# Patient Record
Sex: FEMALE | Race: WHITE
[De-identification: names, ages, dates, MRNs, and addresses within clinical notes are randomized per-mention and may not be internally consistent; named-entity substitution may affect disease eponyms.]

---

## 2017-02-02 ENCOUNTER — APPOINTMENT (OUTPATIENT)
Dept: OTOLARYNGOLOGY | Facility: CLINIC | Age: 77
End: 2017-02-02

## 2017-02-02 VITALS
SYSTOLIC BLOOD PRESSURE: 133 MMHG | HEART RATE: 73 BPM | HEIGHT: 67 IN | BODY MASS INDEX: 20.72 KG/M2 | WEIGHT: 132 LBS | DIASTOLIC BLOOD PRESSURE: 76 MMHG

## 2017-02-02 DIAGNOSIS — H91.90 UNSPECIFIED HEARING LOSS, UNSPECIFIED EAR: ICD-10-CM

## 2017-03-27 ENCOUNTER — APPOINTMENT (OUTPATIENT)
Dept: INTERNAL MEDICINE | Facility: CLINIC | Age: 77
End: 2017-03-27

## 2017-03-27 VITALS — HEART RATE: 72 BPM | SYSTOLIC BLOOD PRESSURE: 128 MMHG | RESPIRATION RATE: 14 BRPM | DIASTOLIC BLOOD PRESSURE: 78 MMHG

## 2017-03-29 LAB
ALBUMIN SERPL ELPH-MCNC: 4.6 G/DL
ALP BLD-CCNC: 76 U/L
ALT SERPL-CCNC: 30 U/L
ANION GAP SERPL CALC-SCNC: 20 MMOL/L
AST SERPL-CCNC: 33 U/L
BILIRUB SERPL-MCNC: 0.4 MG/DL
BUN SERPL-MCNC: 15 MG/DL
CALCIUM SERPL-MCNC: 10.3 MG/DL
CHLORIDE SERPL-SCNC: 100 MMOL/L
CHOLEST SERPL-MCNC: 261 MG/DL
CHOLEST/HDLC SERPL: 2.7 RATIO
CO2 SERPL-SCNC: 23 MMOL/L
CREAT SERPL-MCNC: 0.68 MG/DL
GLUCOSE SERPL-MCNC: 100 MG/DL
HBA1C MFR BLD HPLC: 5.9 %
HDLC SERPL-MCNC: 95 MG/DL
LDLC SERPL CALC-MCNC: 153 MG/DL
POTASSIUM SERPL-SCNC: 4.6 MMOL/L
PROT SERPL-MCNC: 7.3 G/DL
SODIUM SERPL-SCNC: 143 MMOL/L
TRIGL SERPL-MCNC: 66 MG/DL

## 2017-03-30 ENCOUNTER — TRANSCRIPTION ENCOUNTER (OUTPATIENT)
Age: 77
End: 2017-03-30

## 2017-04-21 ENCOUNTER — APPOINTMENT (OUTPATIENT)
Dept: SURGERY | Facility: CLINIC | Age: 77
End: 2017-04-21

## 2017-04-21 VITALS
SYSTOLIC BLOOD PRESSURE: 131 MMHG | BODY MASS INDEX: 21.21 KG/M2 | DIASTOLIC BLOOD PRESSURE: 79 MMHG | OXYGEN SATURATION: 99 % | HEIGHT: 66 IN | TEMPERATURE: 98.1 F | RESPIRATION RATE: 14 BRPM | HEART RATE: 70 BPM | WEIGHT: 132 LBS

## 2017-04-21 DIAGNOSIS — K43.9 VENTRAL HERNIA W/OUT OBSTRUCTION OR GANGRENE: ICD-10-CM

## 2017-04-21 RX ORDER — TRIAMCINOLONE ACETONIDE 1 MG/G
0.1 CREAM TOPICAL
Qty: 45 | Refills: 0 | Status: DISCONTINUED | COMMUNITY
Start: 2016-12-12 | End: 2017-04-21

## 2017-04-24 LAB
MRSA SPEC QL CULT: NOT DETECTED
STAPH AUREUS (SA): DETECTED

## 2017-04-27 ENCOUNTER — FORM ENCOUNTER (OUTPATIENT)
Age: 77
End: 2017-04-27

## 2017-04-28 ENCOUNTER — APPOINTMENT (OUTPATIENT)
Dept: CT IMAGING | Facility: CLINIC | Age: 77
End: 2017-04-28

## 2017-04-28 ENCOUNTER — OUTPATIENT (OUTPATIENT)
Dept: OUTPATIENT SERVICES | Facility: HOSPITAL | Age: 77
LOS: 1 days | End: 2017-04-28
Payer: COMMERCIAL

## 2017-04-28 DIAGNOSIS — Z98.89 OTHER SPECIFIED POSTPROCEDURAL STATES: Chronic | ICD-10-CM

## 2017-04-28 DIAGNOSIS — Z98.41 CATARACT EXTRACTION STATUS, RIGHT EYE: Chronic | ICD-10-CM

## 2017-04-28 DIAGNOSIS — Z41.9 ENCOUNTER FOR PROCEDURE FOR PURPOSES OTHER THAN REMEDYING HEALTH STATE, UNSPECIFIED: Chronic | ICD-10-CM

## 2017-04-28 DIAGNOSIS — Z90.710 ACQUIRED ABSENCE OF BOTH CERVIX AND UTERUS: Chronic | ICD-10-CM

## 2017-04-28 DIAGNOSIS — K66.0 PERITONEAL ADHESIONS (POSTPROCEDURAL) (POSTINFECTION): Chronic | ICD-10-CM

## 2017-04-28 DIAGNOSIS — K43.9 VENTRAL HERNIA WITHOUT OBSTRUCTION OR GANGRENE: ICD-10-CM

## 2017-04-28 PROCEDURE — 82565 ASSAY OF CREATININE: CPT

## 2017-04-28 PROCEDURE — 74177 CT ABD & PELVIS W/CONTRAST: CPT

## 2017-05-09 ENCOUNTER — LABORATORY RESULT (OUTPATIENT)
Age: 77
End: 2017-05-09

## 2017-05-09 ENCOUNTER — APPOINTMENT (OUTPATIENT)
Dept: INTERNAL MEDICINE | Facility: CLINIC | Age: 77
End: 2017-05-09

## 2017-05-09 VITALS — SYSTOLIC BLOOD PRESSURE: 110 MMHG | DIASTOLIC BLOOD PRESSURE: 70 MMHG

## 2017-05-12 ENCOUNTER — RESULT REVIEW (OUTPATIENT)
Age: 77
End: 2017-05-12

## 2017-05-12 LAB
B BURGDOR IGG+IGM SER QL IB: NORMAL
INSECT SPEC: ABNORMAL

## 2017-06-20 ENCOUNTER — LABORATORY RESULT (OUTPATIENT)
Age: 77
End: 2017-06-20

## 2017-06-21 ENCOUNTER — APPOINTMENT (OUTPATIENT)
Dept: INTERNAL MEDICINE | Facility: CLINIC | Age: 77
End: 2017-06-21

## 2017-06-21 VITALS — RESPIRATION RATE: 14 BRPM | DIASTOLIC BLOOD PRESSURE: 70 MMHG | SYSTOLIC BLOOD PRESSURE: 110 MMHG | HEART RATE: 72 BPM

## 2017-06-21 RX ORDER — DOXYCYCLINE HYCLATE 100 MG/1
100 TABLET ORAL ONCE
Qty: 2 | Refills: 0 | Status: DISCONTINUED | COMMUNITY
Start: 2017-05-09 | End: 2017-06-21

## 2017-06-22 LAB
B BURGDOR IGG+IGM SER QL IB: NORMAL
HCV AB SER QL: NONREACTIVE
HCV S/CO RATIO: 0.09 S/CO

## 2017-09-14 ENCOUNTER — APPOINTMENT (OUTPATIENT)
Dept: INTERNAL MEDICINE | Facility: CLINIC | Age: 77
End: 2017-09-14
Payer: COMMERCIAL

## 2017-09-14 PROCEDURE — 90662 IIV NO PRSV INCREASED AG IM: CPT

## 2017-09-14 PROCEDURE — G0008: CPT

## 2017-11-29 ENCOUNTER — APPOINTMENT (OUTPATIENT)
Dept: INTERNAL MEDICINE | Facility: CLINIC | Age: 77
End: 2017-11-29
Payer: COMMERCIAL

## 2017-11-29 PROCEDURE — 36415 COLL VENOUS BLD VENIPUNCTURE: CPT

## 2017-11-30 LAB
25(OH)D3 SERPL-MCNC: 31.8 NG/ML
ALBUMIN SERPL ELPH-MCNC: 4.4 G/DL
ALP BLD-CCNC: 74 U/L
ALT SERPL-CCNC: 19 U/L
ANION GAP SERPL CALC-SCNC: 12 MMOL/L
APPEARANCE: CLEAR
AST SERPL-CCNC: 25 U/L
BASOPHILS # BLD AUTO: 0.02 K/UL
BASOPHILS NFR BLD AUTO: 0.4 %
BILIRUB SERPL-MCNC: 0.5 MG/DL
BILIRUBIN URINE: NEGATIVE
BLOOD URINE: NEGATIVE
BUN SERPL-MCNC: 13 MG/DL
CALCIUM SERPL-MCNC: 9.7 MG/DL
CHLORIDE SERPL-SCNC: 101 MMOL/L
CHOLEST SERPL-MCNC: 243 MG/DL
CHOLEST/HDLC SERPL: 2.6 RATIO
CO2 SERPL-SCNC: 29 MMOL/L
COLOR: YELLOW
CREAT SERPL-MCNC: 0.57 MG/DL
CREAT SPEC-SCNC: 64 MG/DL
EOSINOPHIL # BLD AUTO: 0.2 K/UL
EOSINOPHIL NFR BLD AUTO: 4
GLUCOSE QUALITATIVE U: NEGATIVE MG/DL
GLUCOSE SERPL-MCNC: 93 MG/DL
HBA1C MFR BLD HPLC: 5.6 %
HCT VFR BLD CALC: 41.2 %
HDLC SERPL-MCNC: 93 MG/DL
HGB BLD-MCNC: 13 G/DL
IMM GRANULOCYTES NFR BLD AUTO: 0.2 %
KETONES URINE: NEGATIVE
LDLC SERPL CALC-MCNC: 135 MG/DL
LEUKOCYTE ESTERASE URINE: NEGATIVE
LYMPHOCYTES # BLD AUTO: 1.52 K/UL
LYMPHOCYTES NFR BLD AUTO: 30 %
MAN DIFF?: NORMAL
MCHC RBC-ENTMCNC: 28.1 PG
MCHC RBC-ENTMCNC: 31.6 GM/DL
MCV RBC AUTO: 89 FL
MICROALBUMIN 24H UR DL<=1MG/L-MCNC: 2.1 MG/DL
MICROALBUMIN/CREAT 24H UR-RTO: 33 MG/G
MONOCYTES # BLD AUTO: 0.35 K/UL
MONOCYTES NFR BLD AUTO: 6.9 %
NEUTROPHILS # BLD AUTO: 2.96 K/UL
NEUTROPHILS NFR BLD AUTO: 58.5 %
NITRITE URINE: NEGATIVE
PH URINE: 8
PLATELET # BLD AUTO: 184 K/UL
POTASSIUM SERPL-SCNC: 4.7 MMOL/L
PROT SERPL-MCNC: 6.5 G/DL
PROTEIN URINE: NEGATIVE MG/DL
RBC # BLD: 4.63 M/UL
RBC # FLD: 14.7 %
SODIUM SERPL-SCNC: 142 MMOL/L
SPECIFIC GRAVITY URINE: 1.01
T4 SERPL-MCNC: 6.6 UG/DL
TRIGL SERPL-MCNC: 75 MG/DL
TSH SERPL-ACNC: 1.21 UIU/ML
UROBILINOGEN URINE: NEGATIVE MG/DL
WBC # FLD AUTO: 5.06 K/UL

## 2017-12-01 LAB — MAGNESIUM SERPL-MCNC: 2 MG/DL

## 2017-12-08 ENCOUNTER — NON-APPOINTMENT (OUTPATIENT)
Age: 77
End: 2017-12-08

## 2017-12-08 ENCOUNTER — APPOINTMENT (OUTPATIENT)
Dept: INTERNAL MEDICINE | Facility: CLINIC | Age: 77
End: 2017-12-08
Payer: COMMERCIAL

## 2017-12-08 VITALS — HEART RATE: 72 BPM | DIASTOLIC BLOOD PRESSURE: 70 MMHG | RESPIRATION RATE: 14 BRPM | SYSTOLIC BLOOD PRESSURE: 126 MMHG

## 2017-12-08 VITALS — BODY MASS INDEX: 21.56 KG/M2 | HEIGHT: 65.5 IN | WEIGHT: 131 LBS

## 2017-12-08 PROCEDURE — 93000 ELECTROCARDIOGRAM COMPLETE: CPT

## 2017-12-08 PROCEDURE — 99397 PER PM REEVAL EST PAT 65+ YR: CPT | Mod: 25

## 2017-12-08 PROCEDURE — 94010 BREATHING CAPACITY TEST: CPT

## 2017-12-08 PROCEDURE — 99213 OFFICE O/P EST LOW 20 MIN: CPT | Mod: 25

## 2017-12-08 RX ORDER — ERYTHROMYCIN 5 MG/G
5 OINTMENT OPHTHALMIC
Qty: 4 | Refills: 0 | Status: COMPLETED | COMMUNITY
Start: 2017-10-13

## 2017-12-08 RX ORDER — NEOMYCIN SULFATE, POLYMYXIN B SULFATE AND DEXAMETHASONE 3.5; 10000; 1 MG/ML; [USP'U]/ML; MG/ML
3.5-10000-0.1 SUSPENSION OPHTHALMIC
Qty: 5 | Refills: 0 | Status: COMPLETED | COMMUNITY
Start: 2017-10-19

## 2017-12-08 RX ORDER — LOTEPREDNOL ETABONATE 5 MG/ML
0.5 SUSPENSION/ DROPS OPHTHALMIC
Qty: 5 | Refills: 0 | Status: COMPLETED | COMMUNITY
Start: 2017-10-25

## 2018-01-10 ENCOUNTER — APPOINTMENT (OUTPATIENT)
Dept: INTERNAL MEDICINE | Facility: CLINIC | Age: 78
End: 2018-01-10
Payer: COMMERCIAL

## 2018-01-10 VITALS — DIASTOLIC BLOOD PRESSURE: 70 MMHG | RESPIRATION RATE: 14 BRPM | SYSTOLIC BLOOD PRESSURE: 110 MMHG | HEART RATE: 72 BPM

## 2018-01-10 PROCEDURE — 99213 OFFICE O/P EST LOW 20 MIN: CPT

## 2018-01-16 ENCOUNTER — TRANSCRIPTION ENCOUNTER (OUTPATIENT)
Age: 78
End: 2018-01-16

## 2018-03-09 ENCOUNTER — APPOINTMENT (OUTPATIENT)
Dept: INTERNAL MEDICINE | Facility: CLINIC | Age: 78
End: 2018-03-09
Payer: COMMERCIAL

## 2018-03-09 VITALS — SYSTOLIC BLOOD PRESSURE: 110 MMHG | HEART RATE: 72 BPM | RESPIRATION RATE: 14 BRPM | DIASTOLIC BLOOD PRESSURE: 70 MMHG

## 2018-03-09 PROCEDURE — 99213 OFFICE O/P EST LOW 20 MIN: CPT

## 2018-05-08 ENCOUNTER — APPOINTMENT (OUTPATIENT)
Dept: INTERNAL MEDICINE | Facility: CLINIC | Age: 78
End: 2018-05-08
Payer: COMMERCIAL

## 2018-05-08 VITALS — DIASTOLIC BLOOD PRESSURE: 80 MMHG | TEMPERATURE: 98.4 F | SYSTOLIC BLOOD PRESSURE: 120 MMHG
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
## 2018-05-08 PROCEDURE — 99214 OFFICE O/P EST MOD 30 MIN: CPT

## 2018-05-08 RX ORDER — AZITHROMYCIN 250 MG/1
250 TABLET, FILM COATED ORAL
Qty: 1 | Refills: 0 | Status: DISCONTINUED | COMMUNITY
Start: 2018-03-09 | End: 2018-05-08

## 2018-05-08 RX ORDER — MUPIROCIN 20 MG/G
2 OINTMENT TOPICAL
Qty: 15 | Refills: 0 | Status: DISCONTINUED | COMMUNITY
Start: 2017-04-24 | End: 2018-05-08

## 2018-05-09 ENCOUNTER — TRANSCRIPTION ENCOUNTER (OUTPATIENT)
Age: 78
End: 2018-05-09

## 2018-05-09 ENCOUNTER — APPOINTMENT (OUTPATIENT)
Dept: INTERNAL MEDICINE | Facility: CLINIC | Age: 78
End: 2018-05-09
Payer: COMMERCIAL

## 2018-05-09 VITALS — HEART RATE: 72 BPM | RESPIRATION RATE: 14 BRPM | DIASTOLIC BLOOD PRESSURE: 80 MMHG | SYSTOLIC BLOOD PRESSURE: 120 MMHG

## 2018-05-09 DIAGNOSIS — J30.2 OTHER SEASONAL ALLERGIC RHINITIS: ICD-10-CM

## 2018-05-09 LAB
BASOPHILS # BLD AUTO: 0.02 K/UL
BASOPHILS NFR BLD AUTO: 0.2 %
EOSINOPHIL # BLD AUTO: 0.3 K/UL
EOSINOPHIL NFR BLD AUTO: 3.6 %
HCT VFR BLD CALC: 40.2 %
HGB BLD-MCNC: 12.9 G/DL
IMM GRANULOCYTES NFR BLD AUTO: 0.2 %
LYMPHOCYTES # BLD AUTO: 1.72 K/UL
LYMPHOCYTES NFR BLD AUTO: 20.6 %
MAN DIFF?: NORMAL
MCHC RBC-ENTMCNC: 27.5 PG
MCHC RBC-ENTMCNC: 32.1 GM/DL
MCV RBC AUTO: 85.7 FL
MONOCYTES # BLD AUTO: 0.61 K/UL
MONOCYTES NFR BLD AUTO: 7.3 %
NEUTROPHILS # BLD AUTO: 5.67 K/UL
NEUTROPHILS NFR BLD AUTO: 68.1 %
PLATELET # BLD AUTO: 216 K/UL
RBC # BLD: 4.69 M/UL
RBC # FLD: 14.7 %
WBC # FLD AUTO: 8.34 K/UL

## 2018-05-09 PROCEDURE — 36415 COLL VENOUS BLD VENIPUNCTURE: CPT

## 2018-05-09 PROCEDURE — 99000 SPECIMEN HANDLING OFFICE-LAB: CPT

## 2018-05-09 PROCEDURE — 99213 OFFICE O/P EST LOW 20 MIN: CPT | Mod: 25

## 2018-05-25 ENCOUNTER — APPOINTMENT (OUTPATIENT)
Dept: CARDIOLOGY | Facility: CLINIC | Age: 78
End: 2018-05-25

## 2018-06-14 ENCOUNTER — LABORATORY RESULT (OUTPATIENT)
Age: 78
End: 2018-06-14

## 2018-06-15 ENCOUNTER — APPOINTMENT (OUTPATIENT)
Dept: CARDIOLOGY | Facility: CLINIC | Age: 78
End: 2018-06-15
Payer: COMMERCIAL

## 2018-06-15 ENCOUNTER — NON-APPOINTMENT (OUTPATIENT)
Age: 78
End: 2018-06-15

## 2018-06-15 VITALS
HEIGHT: 66 IN | RESPIRATION RATE: 16 BRPM | HEART RATE: 76 BPM | BODY MASS INDEX: 21.69 KG/M2 | DIASTOLIC BLOOD PRESSURE: 70 MMHG | WEIGHT: 135 LBS | SYSTOLIC BLOOD PRESSURE: 118 MMHG

## 2018-06-15 PROCEDURE — 93000 ELECTROCARDIOGRAM COMPLETE: CPT

## 2018-06-15 PROCEDURE — 99213 OFFICE O/P EST LOW 20 MIN: CPT

## 2018-06-15 RX ORDER — IPRATROPIUM BROMIDE 21 UG/1
0.03 SPRAY NASAL
Qty: 1 | Refills: 1 | Status: DISCONTINUED | COMMUNITY
Start: 2018-05-08 | End: 2018-06-15

## 2018-06-15 RX ORDER — BENZONATATE 100 MG/1
100 CAPSULE ORAL 3 TIMES DAILY
Qty: 30 | Refills: 0 | Status: DISCONTINUED | COMMUNITY
Start: 2018-05-10 | End: 2018-06-15

## 2018-06-15 RX ORDER — CYCLOSPORINE 0.5 MG/ML
0.05 EMULSION OPHTHALMIC
Qty: 60 | Refills: 0 | Status: DISCONTINUED | COMMUNITY
Start: 2017-11-08 | End: 2018-06-15

## 2018-06-15 RX ORDER — FLUTICASONE PROPIONATE 50 UG/1
50 SPRAY, METERED NASAL TWICE DAILY
Qty: 1 | Refills: 1 | Status: DISCONTINUED | COMMUNITY
Start: 2018-05-08 | End: 2018-06-15

## 2018-06-15 RX ORDER — AZITHROMYCIN 250 MG/1
250 TABLET, FILM COATED ORAL
Qty: 6 | Refills: 0 | Status: DISCONTINUED | COMMUNITY
Start: 2018-05-10 | End: 2018-06-15

## 2018-06-15 RX ORDER — MONTELUKAST 10 MG/1
10 TABLET, FILM COATED ORAL
Qty: 30 | Refills: 2 | Status: DISCONTINUED | COMMUNITY
Start: 2018-05-08 | End: 2018-06-15

## 2018-07-09 ENCOUNTER — APPOINTMENT (OUTPATIENT)
Dept: INTERNAL MEDICINE | Facility: CLINIC | Age: 78
End: 2018-07-09
Payer: COMMERCIAL

## 2018-07-09 PROCEDURE — 90750 HZV VACC RECOMBINANT IM: CPT

## 2018-07-09 PROCEDURE — 90471 IMMUNIZATION ADMIN: CPT

## 2018-10-08 ENCOUNTER — APPOINTMENT (OUTPATIENT)
Dept: INTERNAL MEDICINE | Facility: CLINIC | Age: 78
End: 2018-10-08
Payer: COMMERCIAL

## 2018-10-08 PROCEDURE — G0008: CPT

## 2018-10-08 PROCEDURE — 90662 IIV NO PRSV INCREASED AG IM: CPT

## 2018-11-01 ENCOUNTER — TRANSCRIPTION ENCOUNTER (OUTPATIENT)
Age: 78
End: 2018-11-01

## 2018-11-05 ENCOUNTER — APPOINTMENT (OUTPATIENT)
Dept: INTERNAL MEDICINE | Facility: CLINIC | Age: 78
End: 2018-11-05
Payer: COMMERCIAL

## 2018-11-05 PROCEDURE — 90750 HZV VACC RECOMBINANT IM: CPT

## 2018-11-05 PROCEDURE — 90471 IMMUNIZATION ADMIN: CPT

## 2018-11-07 ENCOUNTER — TRANSCRIPTION ENCOUNTER (OUTPATIENT)
Age: 78
End: 2018-11-07

## 2018-11-15 ENCOUNTER — LABORATORY RESULT (OUTPATIENT)
Age: 78
End: 2018-11-15

## 2018-11-16 ENCOUNTER — APPOINTMENT (OUTPATIENT)
Dept: CARDIOLOGY | Facility: CLINIC | Age: 78
End: 2018-11-16
Payer: COMMERCIAL

## 2018-11-16 VITALS
WEIGHT: 130 LBS | RESPIRATION RATE: 15 BRPM | DIASTOLIC BLOOD PRESSURE: 74 MMHG | HEIGHT: 66 IN | SYSTOLIC BLOOD PRESSURE: 122 MMHG | BODY MASS INDEX: 20.89 KG/M2 | HEART RATE: 66 BPM

## 2018-11-16 PROCEDURE — 93015 CV STRESS TEST SUPVJ I&R: CPT

## 2018-11-16 PROCEDURE — 99213 OFFICE O/P EST LOW 20 MIN: CPT | Mod: 25

## 2018-11-16 PROCEDURE — 93306 TTE W/DOPPLER COMPLETE: CPT

## 2018-11-16 PROCEDURE — ZZZZZ: CPT

## 2018-11-16 NOTE — PHYSICAL EXAM
[General Appearance - Well Developed] : well developed [Normal Appearance] : normal appearance [Well Groomed] : well groomed [General Appearance - Well Nourished] : well nourished [No Deformities] : no deformities [General Appearance - In No Acute Distress] : no acute distress [Normal Conjunctiva] : the conjunctiva exhibited no abnormalities [Normal Oral Mucosa] : normal oral mucosa [Normal Oropharynx] : normal oropharynx [Normal Jugular Venous A Waves Present] : normal jugular venous A waves present [Normal Jugular Venous V Waves Present] : normal jugular venous V waves present [No Jugular Venous Feldman A Waves] : no jugular venous feldman A waves [Respiration, Rhythm And Depth] : normal respiratory rhythm and effort [Exaggerated Use Of Accessory Muscles For Inspiration] : no accessory muscle use [Auscultation Breath Sounds / Voice Sounds] : lungs were clear to auscultation bilaterally [Bowel Sounds] : normal bowel sounds [Abdomen Soft] : soft [Abnormal Walk] : normal gait [Nail Clubbing] : no clubbing of the fingernails [Cyanosis, Localized] : no localized cyanosis [Skin Color & Pigmentation] : normal skin color and pigmentation [Skin Turgor] : normal skin turgor [] : no rash [Oriented To Time, Place, And Person] : oriented to person, place, and time [Impaired Insight] : insight and judgment were intact [No Anxiety] : not feeling anxious [5th Left ICS - MCL] : palpated at the 5th LICS in the midclavicular line [Normal] : normal [No Precordial Heave] : no precordial heave was noted [Normal Rate] : normal [Rhythm Regular] : regular [Normal S1] : normal S1 [Normal S2] : normal S2 [No Gallop] : no gallop heard [II] : a grade 2 [I] : a grade 1 [2+] : left 2+ [No Abnormalities] : the abdominal aorta was not enlarged and no bruit was heard [No Pitting Edema] : no pitting edema present [Apical Thrill] : no thrill palpable at the apex [S3] : no S3 [S4] : no S4 [Click] : no click [Pericardial Rub] : no pericardial rub [Right Carotid Bruit] : no bruit heard over the right carotid [Left Carotid Bruit] : no bruit heard over the left carotid [Right Femoral Bruit] : no bruit heard over the right femoral artery [Left Femoral Bruit] : no bruit heard over the left femoral artery

## 2018-11-16 NOTE — REASON FOR VISIT
[Follow-Up - Clinic] : a clinic follow-up of [Dyspnea] : dyspnea [Hyperlipidemia] : hyperlipidemia [Mitral Regurgitation] : mitral regurgitation [FreeTextEntry1] : 77-year-old female past medical history is that of a murmur, hyperlipidemia, minimal coronary disease, who comes in for followup cardiac evaluation.

## 2018-11-16 NOTE — DISCUSSION/SUMMARY
[FreeTextEntry1] : This is a 77-year-old female past medical history is that of a murmur, hyperlipidemia, minimal coronary disease, who comes in for followup cardiac evaluation.The patient is doing well from a cardiac standpoint. She denies chest pain, shortness of breath, dizziness or syncope. \par The patient has occasional dyspnea on exertion.\par The patient had a normal  stress test November 16 2018. She also had an echo Doppler examination done today.\par She will continue on her current medical therapy.\par  Her blood pressure is under good control. I counseled with regard to diet, exercise, and salt intake. She is instructed to follow up with you regarding her medical care. She will undergo blood work later today.\par The patient understands that aerobic exercises must be increased to 40 minutes 4 times per week. A detailed discussion of lifestyle modification was done today. The patient has a good understanding of the diagnosis, and treatment plan. Lifestyle modification was also outlined.

## 2018-11-16 NOTE — REVIEW OF SYSTEMS
[Dyspnea on exertion] : dyspnea during exertion [Negative] : Heme/Lymph [Chest  Pressure] : no chest pressure [Chest Pain] : no chest pain [Lower Ext Edema] : no extremity edema [Leg Claudication] : no intermittent leg claudication [Palpitations] : no palpitations

## 2018-11-23 ENCOUNTER — APPOINTMENT (OUTPATIENT)
Dept: INTERNAL MEDICINE | Facility: CLINIC | Age: 78
End: 2018-11-23
Payer: COMMERCIAL

## 2018-11-23 VITALS — TEMPERATURE: 98.2 F | SYSTOLIC BLOOD PRESSURE: 120 MMHG | DIASTOLIC BLOOD PRESSURE: 70 MMHG

## 2018-11-23 PROCEDURE — 99214 OFFICE O/P EST MOD 30 MIN: CPT

## 2018-12-14 ENCOUNTER — APPOINTMENT (OUTPATIENT)
Dept: INTERNAL MEDICINE | Facility: CLINIC | Age: 78
End: 2018-12-14
Payer: COMMERCIAL

## 2018-12-14 ENCOUNTER — LABORATORY RESULT (OUTPATIENT)
Age: 78
End: 2018-12-14

## 2018-12-14 VITALS — DIASTOLIC BLOOD PRESSURE: 70 MMHG | HEART RATE: 72 BPM | RESPIRATION RATE: 14 BRPM | SYSTOLIC BLOOD PRESSURE: 130 MMHG

## 2018-12-14 VITALS — HEIGHT: 66 IN | BODY MASS INDEX: 21.05 KG/M2 | WEIGHT: 131 LBS

## 2018-12-14 PROCEDURE — 99397 PER PM REEVAL EST PAT 65+ YR: CPT | Mod: 25

## 2018-12-14 PROCEDURE — 94010 BREATHING CAPACITY TEST: CPT

## 2018-12-14 PROCEDURE — 99213 OFFICE O/P EST LOW 20 MIN: CPT | Mod: 25

## 2018-12-14 PROCEDURE — 99000 SPECIMEN HANDLING OFFICE-LAB: CPT

## 2018-12-14 PROCEDURE — 36415 COLL VENOUS BLD VENIPUNCTURE: CPT

## 2018-12-14 RX ORDER — AMOXICILLIN AND CLAVULANATE POTASSIUM 875; 125 MG/1; MG/1
875-125 TABLET, COATED ORAL
Qty: 14 | Refills: 0 | Status: DISCONTINUED | COMMUNITY
Start: 2018-11-23 | End: 2018-12-14

## 2018-12-14 NOTE — HEALTH RISK ASSESSMENT
[Excellent] : ~his/her~  mood as  excellent [] : No [No falls in past year] : Patient reported no falls in the past year [0] : 2) Feeling down, depressed, or hopeless: Not at all (0) [[id number]] : 0 [Change in mental status noted] : No change in mental status noted [With Significant Other] : lives with significant other [] :  [Fully functional (bathing, dressing, toileting, transferring, walking, feeding)] : Fully functional (bathing, dressing, toileting, transferring, walking, feeding) [Fully functional (using the telephone, shopping, preparing meals, housekeeping, doing laundry, using] : Fully functional and needs no help or supervision to perform IADLs (using the telephone, shopping, preparing meals, housekeeping, doing laundry, using transportation, managing medications and managing finances) [Reports changes in hearing] : Reports no changes in hearing [Reports changes in vision] : Reports no changes in vision [Reports changes in dental health] : Reports no changes in dental health

## 2018-12-14 NOTE — PHYSICAL EXAM
[General Appearance - Alert] : alert [General Appearance - In No Acute Distress] : in no acute distress [Sclera] : the sclera and conjunctiva were normal [PERRL With Normal Accommodation] : pupils were equal in size, round, and reactive to light [Extraocular Movements] : extraocular movements were intact [Outer Ear] : the ears and nose were normal in appearance [Oropharynx] : the oropharynx was normal [Neck Appearance] : the appearance of the neck was normal [Neck Cervical Mass (___cm)] : no neck mass was observed [Jugular Venous Distention Increased] : there was no jugular-venous distention [Thyroid Diffuse Enlargement] : the thyroid was not enlarged [Thyroid Nodule] : there were no palpable thyroid nodules [Auscultation Breath Sounds / Voice Sounds] : lungs were clear to auscultation bilaterally [Heart Rate And Rhythm] : heart rate was normal and rhythm regular [Heart Sounds] : normal S1 and S2 [Heart Sounds Gallop] : no gallops [Murmurs] : no murmurs [Heart Sounds Pericardial Friction Rub] : no pericardial rub [Full Pulse] : the pedal pulses are present [Edema] : there was no peripheral edema [Bowel Sounds] : normal bowel sounds [Abdomen Soft] : soft [Abdomen Tenderness] : non-tender [] : no hepato-splenomegaly [Abdomen Mass (___ Cm)] : no abdominal mass palpated [Cervical Lymph Nodes Enlarged Posterior Bilaterally] : posterior cervical [Cervical Lymph Nodes Enlarged Anterior Bilaterally] : anterior cervical [Supraclavicular Lymph Nodes Enlarged Bilaterally] : supraclavicular [No CVA Tenderness] : no ~M costovertebral angle tenderness [No Spinal Tenderness] : no spinal tenderness [Abnormal Walk] : normal gait [Nail Clubbing] : no clubbing  or cyanosis of the fingernails [Musculoskeletal - Swelling] : no joint swelling seen [Motor Tone] : muscle strength and tone were normal [FreeTextEntry1] : Derm Dr Montejo q3-6months [Deep Tendon Reflexes (DTR)] : deep tendon reflexes were 2+ and symmetric [Sensation] : the sensory exam was normal to light touch and pinprick [No Focal Deficits] : no focal deficits [Oriented To Time, Place, And Person] : oriented to person, place, and time [Impaired Insight] : insight and judgment were intact [Affect] : the affect was normal

## 2018-12-14 NOTE — HISTORY OF PRESENT ILLNESS
[Health Maintenance] : health maintenance [___ Year(s) Ago] : [unfilled] year(s) ago [Good] : good [Reg. Dental Visits] : She has regular dental visits [Vision Problems] : She complains of vision problems [Eye Exam < 1 Year] : an eye examination within the last year [Hearing Loss] : She denies hearing loss [Healthy Diet] : She consumes a diverse and healthy diet [Weight Concerns] : She does not have any weight concerns [Regular Exercise] : She exercises regularly [Less than 3 times/wk] : less than three times a week [Walking] : walking [Tobacco Use] : She does not use tobacco [Alcohol Use] : She consumes alcohol [Drug Abuse] : She denies drug use [Postmenopausal] : the patient is postmenopausal [Reviewed and Updated] : risk screening reviewed and updated [Up to Date] : up to date [FreeTextEntry1] : Pt presents for f/u evaluation of borderline lipids/ borderline HgA1c., low vitamin D. Also here for her annual physical.\par She recently saw cardiology.\par \par Without acute complaints today.\par Admits to occasional difficulty with word finding.\par Not sleeping well.\par Less than 4 hours nightly.

## 2018-12-14 NOTE — ASSESSMENT
[FreeTextEntry1] : Labs reviewed\par Blood work was drawn and sent to the lab today. The patient has been instructed to call the office next week to discuss today's lab work.\par \par Continue Vitamin D3\par \par For OBGYN evaluation \par \par Recheck Lipids in 6 months\par Colonoscopy due - pt advised.\par \par Routine health counselling provided\par Dermatology evaluation\par Sleep hygiene discussed in detail with patient.\par \par Annual CC

## 2018-12-21 LAB
25(OH)D3 SERPL-MCNC: 22.5 NG/ML
APPEARANCE: CLEAR
B BURGDOR IGG+IGM SER QL IB: NORMAL
BILIRUBIN URINE: NEGATIVE
BLOOD URINE: NEGATIVE
COLOR: YELLOW
FOLATE SERPL-MCNC: 15.2 NG/ML
GLUCOSE QUALITATIVE U: NEGATIVE MG/DL
KETONES URINE: NEGATIVE
LEUKOCYTE ESTERASE URINE: NEGATIVE
MAGNESIUM SERPL-MCNC: 2.2 MG/DL
NITRITE URINE: NEGATIVE
PH URINE: 6
PROTEIN URINE: NEGATIVE MG/DL
SPECIFIC GRAVITY URINE: 1.03
T PALLIDUM AB SER QL IA: NEGATIVE
UROBILINOGEN URINE: 1 MG/DL
VIT B12 SERPL-MCNC: 540 PG/ML

## 2019-03-02 ENCOUNTER — TRANSCRIPTION ENCOUNTER (OUTPATIENT)
Age: 79
End: 2019-03-02

## 2019-03-08 ENCOUNTER — APPOINTMENT (OUTPATIENT)
Dept: COLORECTAL SURGERY | Facility: CLINIC | Age: 79
End: 2019-03-08
Payer: COMMERCIAL

## 2019-03-08 VITALS
RESPIRATION RATE: 14 BRPM | OXYGEN SATURATION: 100 % | WEIGHT: 130 LBS | BODY MASS INDEX: 20.89 KG/M2 | DIASTOLIC BLOOD PRESSURE: 83 MMHG | HEIGHT: 66 IN | SYSTOLIC BLOOD PRESSURE: 136 MMHG | HEART RATE: 62 BPM

## 2019-03-08 PROCEDURE — 99203 OFFICE O/P NEW LOW 30 MIN: CPT

## 2019-03-08 NOTE — HISTORY OF PRESENT ILLNESS
[FreeTextEntry1] : 77yo female presents for office visit prior to colonoscopy.  Pt underwent colonoscopy Nov.2013 (nl study).  Pt has a mostly daily BM, denies constipation/straining, pain or BRBPR.

## 2019-03-08 NOTE — PHYSICAL EXAM
[Normal Breath Sounds] : Normal breath sounds [Normal Heart Sounds] : normal heart sounds [Normal Rate and Rhythm] : normal rate and rhythm [Alert] : alert [Oriented to Person] : oriented to person [Oriented to Place] : oriented to place [Oriented to Time] : oriented to time [Calm] : calm [de-identified] : soft, NT/ND +BS [de-identified] : well nourished female [de-identified] : NC/AT [de-identified] : +ROM/DEEP

## 2019-03-08 NOTE — REVIEW OF SYSTEMS
[Chest Pain] : no chest pain [Palpitations] : no palpitations [Shortness Of Breath] : no shortness of breath [As Noted in HPI] : as noted in HPI [Negative] : Heme/Lymph [FreeTextEntry3] : wears glasses

## 2019-03-08 NOTE — HISTORY OF PRESENT ILLNESS
[FreeTextEntry1] : 79yo female presents for office visit prior to colonoscopy.  Pt underwent colonoscopy Nov.2013 (nl study).  Pt has a mostly daily BM, denies constipation/straining, pain or BRBPR.

## 2019-03-18 ENCOUNTER — APPOINTMENT (OUTPATIENT)
Dept: COLORECTAL SURGERY | Facility: CLINIC | Age: 79
End: 2019-03-18
Payer: COMMERCIAL

## 2019-03-18 PROCEDURE — 45330 DIAGNOSTIC SIGMOIDOSCOPY: CPT

## 2019-03-28 ENCOUNTER — APPOINTMENT (OUTPATIENT)
Dept: CT IMAGING | Facility: CLINIC | Age: 79
End: 2019-03-28

## 2019-03-28 ENCOUNTER — OUTPATIENT (OUTPATIENT)
Dept: OUTPATIENT SERVICES | Facility: HOSPITAL | Age: 79
LOS: 1 days | End: 2019-03-28
Payer: COMMERCIAL

## 2019-03-28 DIAGNOSIS — Z41.9 ENCOUNTER FOR PROCEDURE FOR PURPOSES OTHER THAN REMEDYING HEALTH STATE, UNSPECIFIED: Chronic | ICD-10-CM

## 2019-03-28 DIAGNOSIS — Z90.710 ACQUIRED ABSENCE OF BOTH CERVIX AND UTERUS: Chronic | ICD-10-CM

## 2019-03-28 DIAGNOSIS — K66.0 PERITONEAL ADHESIONS (POSTPROCEDURAL) (POSTINFECTION): Chronic | ICD-10-CM

## 2019-03-28 DIAGNOSIS — Z00.8 ENCOUNTER FOR OTHER GENERAL EXAMINATION: ICD-10-CM

## 2019-03-28 DIAGNOSIS — Z98.89 OTHER SPECIFIED POSTPROCEDURAL STATES: Chronic | ICD-10-CM

## 2019-03-28 DIAGNOSIS — Z98.41 CATARACT EXTRACTION STATUS, RIGHT EYE: Chronic | ICD-10-CM

## 2019-03-28 PROCEDURE — 74261 CT COLONOGRAPHY DX: CPT

## 2019-03-28 PROCEDURE — 74261 CT COLONOGRAPHY DX: CPT | Mod: 26

## 2019-05-31 ENCOUNTER — APPOINTMENT (OUTPATIENT)
Dept: CARDIOLOGY | Facility: CLINIC | Age: 79
End: 2019-05-31
Payer: COMMERCIAL

## 2019-05-31 ENCOUNTER — NON-APPOINTMENT (OUTPATIENT)
Age: 79
End: 2019-05-31

## 2019-05-31 VITALS
SYSTOLIC BLOOD PRESSURE: 124 MMHG | RESPIRATION RATE: 16 BRPM | HEIGHT: 66 IN | HEART RATE: 84 BPM | DIASTOLIC BLOOD PRESSURE: 80 MMHG | BODY MASS INDEX: 20.41 KG/M2 | WEIGHT: 127 LBS

## 2019-05-31 PROCEDURE — 99213 OFFICE O/P EST LOW 20 MIN: CPT

## 2019-05-31 PROCEDURE — 93000 ELECTROCARDIOGRAM COMPLETE: CPT

## 2019-06-06 RX ORDER — LACTOBACILLUS ACIDOPHILUS/PECT 30 MG-20MG
TABLET ORAL
Refills: 0 | Status: DISCONTINUED | COMMUNITY
End: 2019-06-06

## 2019-07-03 ENCOUNTER — APPOINTMENT (OUTPATIENT)
Dept: INTERNAL MEDICINE | Facility: CLINIC | Age: 79
End: 2019-07-03
Payer: COMMERCIAL

## 2019-07-03 ENCOUNTER — LABORATORY RESULT (OUTPATIENT)
Age: 79
End: 2019-07-03

## 2019-07-03 VITALS — DIASTOLIC BLOOD PRESSURE: 80 MMHG | SYSTOLIC BLOOD PRESSURE: 132 MMHG

## 2019-07-03 VITALS
HEART RATE: 79 BPM | DIASTOLIC BLOOD PRESSURE: 78 MMHG | WEIGHT: 130 LBS | SYSTOLIC BLOOD PRESSURE: 120 MMHG | OXYGEN SATURATION: 99 % | BODY MASS INDEX: 20.98 KG/M2

## 2019-07-03 PROCEDURE — 99204 OFFICE O/P NEW MOD 45 MIN: CPT

## 2019-07-04 LAB
ALBUMIN SERPL ELPH-MCNC: 4.8 G/DL
ALP BLD-CCNC: 84 U/L
ALT SERPL-CCNC: 22 U/L
ANION GAP SERPL CALC-SCNC: 12 MMOL/L
APPEARANCE: CLEAR
AST SERPL-CCNC: 28 U/L
B BURGDOR IGG+IGM SER QL IB: NORMAL
BACTERIA: NEGATIVE
BASOPHILS # BLD AUTO: 0.04 K/UL
BASOPHILS NFR BLD AUTO: 0.7 %
BILIRUB SERPL-MCNC: 0.5 MG/DL
BILIRUBIN URINE: NEGATIVE
BLOOD URINE: NEGATIVE
BUN SERPL-MCNC: 17 MG/DL
CALCIUM SERPL-MCNC: 10 MG/DL
CHLORIDE SERPL-SCNC: 104 MMOL/L
CO2 SERPL-SCNC: 28 MMOL/L
COLOR: NORMAL
CREAT SERPL-MCNC: 0.59 MG/DL
EOSINOPHIL # BLD AUTO: 0.14 K/UL
EOSINOPHIL NFR BLD AUTO: 2.4 %
ERYTHROCYTE [SEDIMENTATION RATE] IN BLOOD BY WESTERGREN METHOD: 10 MM/HR
GLUCOSE QUALITATIVE U: NEGATIVE
GLUCOSE SERPL-MCNC: 97 MG/DL
HCT VFR BLD CALC: 41.8 %
HGB BLD-MCNC: 13.1 G/DL
HYALINE CASTS: 0 /LPF
IMM GRANULOCYTES NFR BLD AUTO: 0.3 %
KETONES URINE: NEGATIVE
LEUKOCYTE ESTERASE URINE: NEGATIVE
LYMPHOCYTES # BLD AUTO: 1.41 K/UL
LYMPHOCYTES NFR BLD AUTO: 23.8 %
MAN DIFF?: NORMAL
MCHC RBC-ENTMCNC: 27.8 PG
MCHC RBC-ENTMCNC: 31.3 GM/DL
MCV RBC AUTO: 88.6 FL
MICROSCOPIC-UA: NORMAL
MONOCYTES # BLD AUTO: 0.48 K/UL
MONOCYTES NFR BLD AUTO: 8.1 %
NEUTROPHILS # BLD AUTO: 3.83 K/UL
NEUTROPHILS NFR BLD AUTO: 64.7 %
NITRITE URINE: NEGATIVE
PH URINE: 7.5
PLATELET # BLD AUTO: 200 K/UL
POTASSIUM SERPL-SCNC: 4.6 MMOL/L
PROT SERPL-MCNC: 6.8 G/DL
PROTEIN URINE: NEGATIVE
RBC # BLD: 4.72 M/UL
RBC # FLD: 14.1 %
RED BLOOD CELLS URINE: 2 /HPF
SODIUM SERPL-SCNC: 144 MMOL/L
SPECIFIC GRAVITY URINE: 1.01
SQUAMOUS EPITHELIAL CELLS: 1 /HPF
TSH SERPL-ACNC: 0.93 UIU/ML
UROBILINOGEN URINE: NORMAL
WBC # FLD AUTO: 5.92 K/UL
WHITE BLOOD CELLS URINE: 0 /HPF

## 2019-07-08 NOTE — PHYSICAL EXAM
[No Acute Distress] : no acute distress [Well Developed] : well developed [Well Nourished] : well nourished [Well-Appearing] : well-appearing [Normal Voice/Communication] : normal voice/communication [PERRL] : pupils equal round and reactive to light [Normal Sclera/Conjunctiva] : normal sclera/conjunctiva [Normal Outer Ear/Nose] : the outer ears and nose were normal in appearance [Normal Oropharynx] : the oropharynx was normal [Normal TMs] : both tympanic membranes were normal [Normal Nasal Mucosa] : the nasal mucosa was normal [No JVD] : no jugular venous distention [No Lymphadenopathy] : no lymphadenopathy [Supple] : supple [Thyroid Normal, No Nodules] : the thyroid was normal and there were no nodules present [No Accessory Muscle Use] : no accessory muscle use [Clear to Auscultation] : lungs were clear to auscultation bilaterally [Regular Rhythm] : with a regular rhythm [Normal Percussion] : the chest was normal to percussion [Normal Rate] : normal rate  [Normal S1, S2] : normal S1 and S2 [No Carotid Bruits] : no carotid bruits [No Varicosities] : no varicosities [No Edema] : there was no peripheral edema [No Palpable Aorta] : no palpable aorta [Normal Appearance] : normal in appearance [No Masses] : no palpable masses [No Nipple Discharge] : no nipple discharge [No Axillary Lymphadenopathy] : no axillary lymphadenopathy [Soft] : abdomen soft [Non-distended] : non-distended [Non Tender] : non-tender [No HSM] : no HSM [No Hernias] : no hernias [Normal Bowel Sounds] : normal bowel sounds [Normal Axillary Nodes] : no axillary lymphadenopathy [Normal Supraclavicular Nodes] : no supraclavicular lymphadenopathy [Normal Anterior Cervical Nodes] : no anterior cervical lymphadenopathy [Normal Inguinal Nodes] : no inguinal lymphadenopathy [No Spinal Tenderness] : no spinal tenderness [No CVA Tenderness] : no CVA  tenderness [Grossly Normal Strength/Tone] : grossly normal strength/tone [No Rash] : no rash [Coordination Grossly Intact] : coordination grossly intact [Speech Grossly Normal] : speech grossly normal [Alert and Oriented x3] : oriented to person, place, and time [Normal Affect] : the affect was normal [Memory Grossly Normal] : memory grossly normal [Normal Insight/Judgement] : insight and judgment were intact [Normal Mood] : the mood was normal [de-identified] : 3/6 systolic murmur left sternal border [de-identified] : Spider veins [de-identified] : Left breast surgical scar [de-identified] : Midline surgical scar

## 2019-07-08 NOTE — HEALTH RISK ASSESSMENT
[Good] : ~his/her~  mood as  good [Yes] : Yes [4 or more  times a week (4 pts)] : 4 or more  times a week (4 points) [Never (0 pts)] : Never (0 points) [One fall no injury in past year] : Patient reported one fall in the past year without injury [0] : 2) Feeling down, depressed, or hopeless: Not at all (0) [Patient reported mammogram was normal] : Patient reported mammogram was normal [Patient reported colonoscopy was normal] : Patient reported colonoscopy was normal [With Significant Other] : lives with significant other [None] : None [Retired] : retired [# of Members in Household ___] :  household currently consist of [unfilled] member(s) [Fully functional (bathing, dressing, toileting, transferring, walking, feeding)] : Fully functional (bathing, dressing, toileting, transferring, walking, feeding) [College] : College [Fully functional (using the telephone, shopping, preparing meals, housekeeping, doing laundry, using] : Fully functional and needs no help or supervision to perform IADLs (using the telephone, shopping, preparing meals, housekeeping, doing laundry, using transportation, managing medications and managing finances) [Reports normal functional visual acuity (ie: able to read med bottle)] : Reports normal functional visual acuity [Smoke Detector] : smoke detector [Carbon Monoxide Detector] : carbon monoxide detector [Safety elements used in home] : safety elements used in home [Seat Belt] :  uses seat belt [Sunscreen] : uses sunscreen [] : No [de-identified] : Glass of wine at night [[id number]] : 0 [Change in mental status noted] : No change in mental status noted [Language] : denies difficulty with language [Behavior] : denies difficulty with behavior [Learning/Retaining New Information] : denies difficulty learning/retaining new information [Handling Complex Tasks] : denies difficulty handling complex tasks [Reasoning] : denies difficulty with reasoning [Spatial Ability and Orientation] : denies difficulty with spatial ability and orientation [Reports changes in hearing] : Reports no changes in hearing [Reports changes in vision] : Reports no changes in vision [Reports changes in dental health] : Reports no changes in dental health [Travel to Developing Areas] : does not  travel to developing areas [TB Exposure] : is not being exposed to tuberculosis [Caregiver Concerns] : does not have caregiver concerns [MammogramDate] : 12/18 [ColonoscopyDate] : 3/18

## 2019-07-08 NOTE — HISTORY OF PRESENT ILLNESS
[FreeTextEntry1] : Establishment of care and complaint of left-sided chest pain, fatigue insomnia and anxiety [de-identified] : The patient is a healthy 78-year-old female with history of noncritical coronary artery disease, mild carotid plaques, mild mitral regurgitation, GERD, anxiety, prediabetes, vitamin D deficiency, hearing loss, colonic polyps, and seasonal allergies who presents for establishment of care.  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

## 2019-07-08 NOTE — REVIEW OF SYSTEMS
[Fatigue] : fatigue [Hearing Loss] : hearing loss [Chest Pain] : chest pain [Joint Pain] : joint pain [Negative] : Psychiatric [Fever] : no fever [Chills] : no chills [Night Sweats] : no night sweats [Hot Flashes] : no hot flashes [Recent Change In Weight] : ~T no recent weight change [Earache] : no earache [Nosebleed] : no nosebleeds [Nasal Discharge] : no nasal discharge [Hoarseness] : no hoarseness [Sore Throat] : no sore throat [Postnasal Drip] : no postnasal drip [Palpitations] : no palpitations [Leg Claudication] : no leg claudication [Lower Ext Edema] : no lower extremity edema [Orthopnea] : no orthopnea [Paroysmal Nocturnal Dyspnea] : no paroysmal nocturnal dyspnea [Joint Stiffness] : no joint stiffness [Joint Swelling] : no joint swelling [Muscle Weakness] : no muscle weakness [Muscle Pain] : no muscle pain [Back Pain] : no back pain

## 2019-07-08 NOTE — ASSESSMENT
[FreeTextEntry1] : Patient is a 78-year-old female with history of mild carotid artery disease, minimal coronary artery disease, prediabetes, mitral regurgitation/aortic regurgitation, seasonal allergies hyperlipidemia, GERD, history of colonic polyps who presents for establishment of care and complaint of fatigue\par \par 1.  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  Insomnia\par Counseled on sleep hygiene and use of melatonin\par Observe\par \par 3.  Anxiety\par Counseled on election techniques\par Consider other therapies in the future if symptoms persist\par \par 4.  Hyperlipidemia\par Counseled on diet and exercise\par \par 5.  GERD\par Continue omeprazole 20 mg for now counseled on lifestyle modifications\par \par 6.  Health maintenance\par Colonoscopy mammogram up-to-date\par Immunizations up-to-date\par Follow-up in 1 month\par \par 7 prediabetes\par Counseled on diet recent hemoglobin A1c 5.8

## 2019-07-18 ENCOUNTER — MEDICATION RENEWAL (OUTPATIENT)
Age: 79
End: 2019-07-18

## 2019-08-07 ENCOUNTER — APPOINTMENT (OUTPATIENT)
Dept: INTERNAL MEDICINE | Facility: CLINIC | Age: 79
End: 2019-08-07
Payer: COMMERCIAL

## 2019-08-07 VITALS
WEIGHT: 129 LBS | DIASTOLIC BLOOD PRESSURE: 80 MMHG | OXYGEN SATURATION: 98 % | HEART RATE: 82 BPM | HEIGHT: 66 IN | BODY MASS INDEX: 20.73 KG/M2 | SYSTOLIC BLOOD PRESSURE: 140 MMHG

## 2019-08-07 VITALS — DIASTOLIC BLOOD PRESSURE: 80 MMHG | SYSTOLIC BLOOD PRESSURE: 128 MMHG

## 2019-08-07 PROCEDURE — 99214 OFFICE O/P EST MOD 30 MIN: CPT

## 2019-08-08 NOTE — ASSESSMENT
[FreeTextEntry1] : Patient is a 78-year-old female with history of mild aortic recruits, mild mitral regurgitation, osteopenia, nonsignificant carotid artery plaque, who presents for follow-up of insomnia, anxiety and now complaint of some memory loss\par \par 1. History of osteopenia\par will check duplex can rule out osteoporosis\par calcium and vitamin D observe\par \par 2. Insomnia/fatigue\par improved with use of melatonin sleeping better\par \par 3. memory loss\par for getting words\par Mini mental status patient did well on distressed caring for elderly \par observe\par \par 4. Anxiety\par slightly improved will observe follow-up in two months

## 2019-08-08 NOTE — PHYSICAL EXAM
[No Acute Distress] : no acute distress [Well Nourished] : well nourished [Well Developed] : well developed [Well-Appearing] : well-appearing [Normal Voice/Communication] : normal voice/communication [No JVD] : no jugular venous distention [No Lymphadenopathy] : no lymphadenopathy [Supple] : supple [No Respiratory Distress] : no respiratory distress  [No Accessory Muscle Use] : no accessory muscle use [Clear to Auscultation] : lungs were clear to auscultation bilaterally [Normal Rate] : normal rate  [Regular Rhythm] : with a regular rhythm [Normal S1, S2] : normal S1 and S2 [No Murmur] : no murmur heard [No Carotid Bruits] : no carotid bruits [No Edema] : there was no peripheral edema [Soft] : abdomen soft [Non-distended] : non-distended [Non Tender] : non-tender [Normal Bowel Sounds] : normal bowel sounds [No HSM] : no HSM [Normal Axillary Nodes] : no axillary lymphadenopathy [Normal Supraclavicular Nodes] : no supraclavicular lymphadenopathy [Normal Anterior Cervical Nodes] : no anterior cervical lymphadenopathy [No CVA Tenderness] : no CVA  tenderness [No Spinal Tenderness] : no spinal tenderness

## 2019-08-08 NOTE — HISTORY OF PRESENT ILLNESS
[FreeTextEntry1] : \par Follow-up for fatigue insomnia and prediabetes [de-identified] : The patient is a 78-year-old female with history of noncritical coronary artery disease, mild carotid artery disease, borderline hypercholesterolemia low vitamin D, history of mitral aortic regurgitation mild mild mitral regurgitation prediabetes allergic rhinitis history of colonic polyps who presents for follow-up of prediabetes, fatigues, insomnia. Patient complains of some inability to find words and memory issues is alert and oriented but under stress caring for elderly  she denies depression, headaches, dizziness.

## 2019-08-15 ENCOUNTER — APPOINTMENT (OUTPATIENT)
Dept: DERMATOLOGY | Facility: CLINIC | Age: 79
End: 2019-08-15
Payer: COMMERCIAL

## 2019-08-15 VITALS
BODY MASS INDEX: 20.25 KG/M2 | HEIGHT: 66 IN | WEIGHT: 126 LBS | DIASTOLIC BLOOD PRESSURE: 64 MMHG | SYSTOLIC BLOOD PRESSURE: 116 MMHG

## 2019-08-15 PROCEDURE — 99203 OFFICE O/P NEW LOW 30 MIN: CPT

## 2019-08-15 NOTE — PHYSICAL EXAM
[Alert] : alert [Well Nourished] : well nourished [Oriented x 3] : ~L oriented x 3 [Conjunctiva Non-injected] : conjunctiva non-injected [No Clubbing] : no clubbing [No Visual Lymphadenopathy] : no visual  lymphadenopathy [No Edema] : no edema [No Bromhidrosis] : no bromhidrosis [No Chromhidrosis] : no chromhidrosis [FreeTextEntry3] : The patient is well-appearing, in no acute distress, alert and oriented x 3. Mood and affect are normal. A complete cutaneous examination of the scalp, face, neck, chest, abdomen, back, bilateral arms, bilateral legs, buttocks, digits, nails, eyelids, conjunctiva and lips reveals the following significant findings:\par - Scaly erythematous papules on the R cheek, R NLF, and R nasal ala\par

## 2019-08-15 NOTE — HISTORY OF PRESENT ILLNESS
[FreeTextEntry1] : growth on nose [de-identified] : 78F with a remote hx of BCC (unclear site) here for growth on the nose. Present x months. Comes and goes. No treatments tried. No modifying factors. Otherwise, no new, evolving, or symptomatic skin lesions.

## 2019-08-15 NOTE — CONSULT LETTER
[Dear  ___] : Dear  [unfilled], [Consult Letter:] : I had the pleasure of evaluating your patient, [unfilled]. [Consult Closing:] : Thank you very much for allowing me to participate in the care of this patient.  If you have any questions, please do not hesitate to contact me. [Please see my note below.] : Please see my note below. [Sincerely,] : Sincerely, [FreeTextEntry3] : Mare Vazquez MD\par [facility]

## 2019-08-26 ENCOUNTER — FORM ENCOUNTER (OUTPATIENT)
Age: 79
End: 2019-08-26

## 2019-08-27 ENCOUNTER — APPOINTMENT (OUTPATIENT)
Dept: RADIOLOGY | Facility: CLINIC | Age: 79
End: 2019-08-27
Payer: COMMERCIAL

## 2019-08-27 ENCOUNTER — OUTPATIENT (OUTPATIENT)
Dept: OUTPATIENT SERVICES | Facility: HOSPITAL | Age: 79
LOS: 1 days | End: 2019-08-27
Payer: COMMERCIAL

## 2019-08-27 ENCOUNTER — CHART COPY (OUTPATIENT)
Age: 79
End: 2019-08-27

## 2019-08-27 DIAGNOSIS — Z90.710 ACQUIRED ABSENCE OF BOTH CERVIX AND UTERUS: Chronic | ICD-10-CM

## 2019-08-27 DIAGNOSIS — M85.80 OTHER SPECIFIED DISORDERS OF BONE DENSITY AND STRUCTURE, UNSPECIFIED SITE: ICD-10-CM

## 2019-08-27 DIAGNOSIS — Z98.89 OTHER SPECIFIED POSTPROCEDURAL STATES: Chronic | ICD-10-CM

## 2019-08-27 DIAGNOSIS — Z98.41 CATARACT EXTRACTION STATUS, RIGHT EYE: Chronic | ICD-10-CM

## 2019-08-27 DIAGNOSIS — K66.0 PERITONEAL ADHESIONS (POSTPROCEDURAL) (POSTINFECTION): Chronic | ICD-10-CM

## 2019-08-27 DIAGNOSIS — Z41.9 ENCOUNTER FOR PROCEDURE FOR PURPOSES OTHER THAN REMEDYING HEALTH STATE, UNSPECIFIED: Chronic | ICD-10-CM

## 2019-08-27 PROCEDURE — 77080 DXA BONE DENSITY AXIAL: CPT

## 2019-08-27 PROCEDURE — 77080 DXA BONE DENSITY AXIAL: CPT | Mod: 26

## 2019-08-29 ENCOUNTER — APPOINTMENT (OUTPATIENT)
Dept: DERMATOLOGY | Facility: CLINIC | Age: 79
End: 2019-08-29
Payer: COMMERCIAL

## 2019-08-29 PROCEDURE — 17000 DESTRUCT PREMALG LESION: CPT | Mod: 59

## 2019-08-29 PROCEDURE — 17110 DESTRUCTION B9 LES UP TO 14: CPT

## 2019-08-29 PROCEDURE — 99213 OFFICE O/P EST LOW 20 MIN: CPT | Mod: 25

## 2019-08-29 PROCEDURE — 17003 DESTRUCT PREMALG LES 2-14: CPT | Mod: 59

## 2019-10-08 ENCOUNTER — APPOINTMENT (OUTPATIENT)
Dept: INTERNAL MEDICINE | Facility: CLINIC | Age: 79
End: 2019-10-08
Payer: COMMERCIAL

## 2019-10-08 ENCOUNTER — NON-APPOINTMENT (OUTPATIENT)
Age: 79
End: 2019-10-08

## 2019-10-08 VITALS
HEART RATE: 63 BPM | TEMPERATURE: 97.9 F | HEIGHT: 66 IN | WEIGHT: 131 LBS | OXYGEN SATURATION: 96 % | DIASTOLIC BLOOD PRESSURE: 70 MMHG | BODY MASS INDEX: 21.05 KG/M2 | SYSTOLIC BLOOD PRESSURE: 118 MMHG

## 2019-10-08 PROCEDURE — G0008: CPT

## 2019-10-08 PROCEDURE — 90662 IIV NO PRSV INCREASED AG IM: CPT

## 2019-10-08 PROCEDURE — 99214 OFFICE O/P EST MOD 30 MIN: CPT | Mod: 25

## 2019-10-08 NOTE — ASSESSMENT
[FreeTextEntry1] : Patient is a 79-year-old female with history of calcified artery plaques, anxiety, Gerd, hearing loss, hypercholesterolemia, low vitamin D, osteopenia, history of prediabetes, history of mitral aortic valve regurgitation mild who presents for follow-up and complaints of atypical chest pain\par \par 1. Atypical chest pain\par patient on distressed caring for elderly \par EKG shows no ischemic changes\par as a cardiology appointment in two weeks\par continue baby aspirin\par \par 2. Osteopenia\par continue calcium vitamin D supplement\par observe\par \par 3 history of Gerd\par continue omeprazole 20 mg\par \par 4. History of hyperlipidemia\par diet controlled for now consider statin therapy in future\par \par 5 health maintenance\par hi dose influenza vaccine given\par follow-up in four months

## 2019-10-08 NOTE — HISTORY OF PRESENT ILLNESS
[FreeTextEntry1] : Follow-up for memory changes and stress [de-identified] : 79-year-old female with history of mild carotid artery disease, hypercholesterolemia, low vitamin D history of mitral and aortic valve regurgitation, osteopenia, anxiety, who presents for follow-up. Patient notes stress taking care of elderly  has episodes of chest tightness when under stress denies shortness of breath palpitation lightheadedness. Otherwise feels well

## 2019-10-29 ENCOUNTER — NON-APPOINTMENT (OUTPATIENT)
Age: 79
End: 2019-10-29

## 2019-10-29 ENCOUNTER — APPOINTMENT (OUTPATIENT)
Dept: CARDIOLOGY | Facility: CLINIC | Age: 79
End: 2019-10-29
Payer: COMMERCIAL

## 2019-10-29 VITALS
DIASTOLIC BLOOD PRESSURE: 82 MMHG | RESPIRATION RATE: 16 BRPM | BODY MASS INDEX: 20.89 KG/M2 | SYSTOLIC BLOOD PRESSURE: 127 MMHG | HEART RATE: 60 BPM | HEIGHT: 66 IN | WEIGHT: 130 LBS

## 2019-10-29 PROCEDURE — 93000 ELECTROCARDIOGRAM COMPLETE: CPT

## 2019-10-29 PROCEDURE — 99213 OFFICE O/P EST LOW 20 MIN: CPT

## 2019-11-26 ENCOUNTER — MEDICATION RENEWAL (OUTPATIENT)
Age: 79
End: 2019-11-26

## 2020-02-11 ENCOUNTER — APPOINTMENT (OUTPATIENT)
Dept: INTERNAL MEDICINE | Facility: CLINIC | Age: 80
End: 2020-02-11
Payer: COMMERCIAL

## 2020-02-11 VITALS
DIASTOLIC BLOOD PRESSURE: 78 MMHG | WEIGHT: 128 LBS | HEIGHT: 66 IN | SYSTOLIC BLOOD PRESSURE: 140 MMHG | BODY MASS INDEX: 20.57 KG/M2 | TEMPERATURE: 97.8 F

## 2020-02-11 VITALS — OXYGEN SATURATION: 99 % | HEART RATE: 80 BPM

## 2020-02-11 DIAGNOSIS — Z87.898 PERSONAL HISTORY OF OTHER SPECIFIED CONDITIONS: ICD-10-CM

## 2020-02-11 DIAGNOSIS — Z87.39 PERSONAL HISTORY OF OTHER DISEASES OF THE MUSCULOSKELETAL SYSTEM AND CONNECTIVE TISSUE: ICD-10-CM

## 2020-02-11 DIAGNOSIS — R07.89 OTHER CHEST PAIN: ICD-10-CM

## 2020-02-11 DIAGNOSIS — M25.579 PAIN IN UNSPECIFIED ANKLE AND JOINTS OF UNSPECIFIED FOOT: ICD-10-CM

## 2020-02-11 DIAGNOSIS — L91.8 OTHER HYPERTROPHIC DISORDERS OF THE SKIN: ICD-10-CM

## 2020-02-11 DIAGNOSIS — Z23 ENCOUNTER FOR IMMUNIZATION: ICD-10-CM

## 2020-02-11 DIAGNOSIS — Z87.09 PERSONAL HISTORY OF OTHER DISEASES OF THE RESPIRATORY SYSTEM: ICD-10-CM

## 2020-02-11 PROCEDURE — 36415 COLL VENOUS BLD VENIPUNCTURE: CPT

## 2020-02-11 PROCEDURE — 99214 OFFICE O/P EST MOD 30 MIN: CPT | Mod: 25

## 2020-02-11 NOTE — HISTORY OF PRESENT ILLNESS
[FreeTextEntry1] : Weight loss and stress [de-identified] : The patient is a 79-year-old female with history of hypercholesterolemia diet controlled, mild mitral regurgitation aortic regurgitation osteopenia, prediabetes, seasonal allergies anxiety, dense breasts, hearing loss who presents for follow-up. Patient complains of stress sleeping better but notes approximately 5 to 10 pounds weight loss over the past year to two. She notes decreasing appetite but denies constipation nausea vomiting cold or heat intolerance diarrhea, constipation. Fever chills. No joint pain

## 2020-02-11 NOTE — REVIEW OF SYSTEMS
[Fatigue] : fatigue [Recent Change In Weight] : ~T recent weight change [Negative] : Genitourinary [Fever] : no fever [Chills] : no chills

## 2020-02-13 LAB
ALBUMIN SERPL ELPH-MCNC: 4.7 G/DL
ALP BLD-CCNC: 80 U/L
ALT SERPL-CCNC: 21 U/L
ANION GAP SERPL CALC-SCNC: 13 MMOL/L
AST SERPL-CCNC: 25 U/L
BASOPHILS # BLD AUTO: 0.04 K/UL
BASOPHILS NFR BLD AUTO: 0.7 %
BILIRUB SERPL-MCNC: 0.5 MG/DL
BUN SERPL-MCNC: 15 MG/DL
CALCIUM SERPL-MCNC: 9.7 MG/DL
CHLORIDE SERPL-SCNC: 104 MMOL/L
CO2 SERPL-SCNC: 27 MMOL/L
CREAT SERPL-MCNC: 0.54 MG/DL
EOSINOPHIL # BLD AUTO: 0.24 K/UL
EOSINOPHIL NFR BLD AUTO: 4.4 %
ERYTHROCYTE [SEDIMENTATION RATE] IN BLOOD BY WESTERGREN METHOD: 6 MM/HR
GLUCOSE SERPL-MCNC: 98 MG/DL
HCT VFR BLD CALC: 42.4 %
HGB BLD-MCNC: 12.9 G/DL
IMM GRANULOCYTES NFR BLD AUTO: 0.4 %
LYMPHOCYTES # BLD AUTO: 1.58 K/UL
LYMPHOCYTES NFR BLD AUTO: 28.7 %
MAN DIFF?: NORMAL
MCHC RBC-ENTMCNC: 28 PG
MCHC RBC-ENTMCNC: 30.4 GM/DL
MCV RBC AUTO: 92.2 FL
MONOCYTES # BLD AUTO: 0.44 K/UL
MONOCYTES NFR BLD AUTO: 8 %
NEUTROPHILS # BLD AUTO: 3.19 K/UL
NEUTROPHILS NFR BLD AUTO: 57.8 %
PLATELET # BLD AUTO: 190 K/UL
POTASSIUM SERPL-SCNC: 4.6 MMOL/L
PROT SERPL-MCNC: 6.4 G/DL
RBC # BLD: 4.6 M/UL
RBC # FLD: 15.2 %
SODIUM SERPL-SCNC: 144 MMOL/L
TSH SERPL-ACNC: 1.04 UIU/ML
WBC # FLD AUTO: 5.51 K/UL

## 2020-03-10 ENCOUNTER — APPOINTMENT (OUTPATIENT)
Dept: INTERNAL MEDICINE | Facility: CLINIC | Age: 80
End: 2020-03-10
Payer: COMMERCIAL

## 2020-03-10 VITALS
OXYGEN SATURATION: 96 % | WEIGHT: 293 LBS | SYSTOLIC BLOOD PRESSURE: 125 MMHG | HEART RATE: 85 BPM | HEIGHT: 66 IN | BODY MASS INDEX: 47.09 KG/M2 | TEMPERATURE: 98.1 F | DIASTOLIC BLOOD PRESSURE: 72 MMHG

## 2020-03-10 PROCEDURE — 99214 OFFICE O/P EST MOD 30 MIN: CPT

## 2020-03-10 NOTE — REVIEW OF SYSTEMS
[Shortness Of Breath] : no shortness of breath [Wheezing] : no wheezing [Cough] : no cough [Dyspnea on Exertion] : no dyspnea on exertion [Abdominal Pain] : no abdominal pain [Nausea] : no nausea [Constipation] : no constipation [Diarrhea] : diarrhea [Vomiting] : no vomiting [Heartburn] : heartburn [Melena] : no melena [Negative] : Cardiovascular

## 2020-03-10 NOTE — ASSESSMENT
[FreeTextEntry1] : Patient is a 79-year-old female with history of Gerd, anxiety, hypercholesterolemia, mild AR MR, dense breasts, vitamin D deficiency osteopenia prediabetes hypercholesterolemia who presents for follow-up of weight loss\par \par 1 weight loss\par weight stable at 128\par will observe\par \par 2.  Gerd\par re start omeprazole 20 mg a day\par reinforce lifestyle modifications\par \par 3 hypercholesterolemia\par patient on diet will observe

## 2020-03-10 NOTE — HISTORY OF PRESENT ILLNESS
[FreeTextEntry1] : Follow-up for weight loss [de-identified] : The patient is a 79-year-old female with history of Gerd, hearing loss, borderline hypercholesterolemia, low vitamin D history of mild aortic regurgitation mitral regurgitation osteopenia prediabetes who presents for follow-up of weight loss. Patient notes stabilization await concerned about coronavirus notes increasing in reflux symptoms with cough and mucus in throat some indigestion denies fever, chills, respiratory symptoms

## 2020-06-18 ENCOUNTER — NON-APPOINTMENT (OUTPATIENT)
Age: 80
End: 2020-06-18

## 2020-06-18 ENCOUNTER — APPOINTMENT (OUTPATIENT)
Dept: INTERNAL MEDICINE | Facility: CLINIC | Age: 80
End: 2020-06-18
Payer: MEDICARE

## 2020-06-18 VITALS
BODY MASS INDEX: 19.61 KG/M2 | OXYGEN SATURATION: 98 % | TEMPERATURE: 99.5 F | HEART RATE: 90 BPM | DIASTOLIC BLOOD PRESSURE: 80 MMHG | SYSTOLIC BLOOD PRESSURE: 142 MMHG | HEIGHT: 66 IN | WEIGHT: 122 LBS

## 2020-06-18 VITALS — SYSTOLIC BLOOD PRESSURE: 134 MMHG | DIASTOLIC BLOOD PRESSURE: 78 MMHG

## 2020-06-18 DIAGNOSIS — E55.9 VITAMIN D DEFICIENCY, UNSPECIFIED: ICD-10-CM

## 2020-06-18 PROCEDURE — G0402 INITIAL PREVENTIVE EXAM: CPT

## 2020-06-18 PROCEDURE — 99213 OFFICE O/P EST LOW 20 MIN: CPT | Mod: 25

## 2020-06-18 PROCEDURE — G0403: CPT

## 2020-06-18 PROCEDURE — G0439: CPT

## 2020-06-18 PROCEDURE — 93000 ELECTROCARDIOGRAM COMPLETE: CPT | Mod: 59

## 2020-06-18 PROCEDURE — 36415 COLL VENOUS BLD VENIPUNCTURE: CPT

## 2020-06-18 RX ORDER — OMEPRAZOLE 20 MG/1
20 CAPSULE, DELAYED RELEASE ORAL DAILY
Qty: 30 | Refills: 1 | Status: DISCONTINUED | COMMUNITY
Start: 2019-07-03 | End: 2020-06-18

## 2020-06-18 NOTE — PHYSICAL EXAM
[No Masses] : no palpable masses [No Axillary Lymphadenopathy] : no axillary lymphadenopathy [No Nipple Discharge] : no nipple discharge [Normal Supraclavicular Nodes] : no supraclavicular lymphadenopathy [Normal Axillary Nodes] : no axillary lymphadenopathy [Normal Posterior Cervical Nodes] : no posterior cervical lymphadenopathy [Normal Anterior Cervical Nodes] : no anterior cervical lymphadenopathy [Normal] : affect was normal and insight and judgment were intact [de-identified] : Fibrocystic changes left breast left upper quadrant no masses

## 2020-06-18 NOTE — HEALTH RISK ASSESSMENT
[Very Good] : ~his/her~ current health as very good [Yes] : Yes [2 - 3 times a week (3 pts)] : 2 - 3  times a week (3 points) [1 or 2 (0 pts)] : 1 or 2 (0 points) [Never (0 pts)] : Never (0 points) [No falls in past year] : Patient reported no falls in the past year [0] : 2) Feeling down, depressed, or hopeless: Not at all (0) [Patient reported mammogram was normal] : Patient reported mammogram was normal [Patient reported colonoscopy was normal] : Patient reported colonoscopy was normal [Patient reported bone density results were normal] : Patient reported bone density results were normal [HIV test declined] : HIV test declined [Hepatitis C test declined] : Hepatitis C test declined [None] : None [# of Members in Household ___] :  household currently consist of [unfilled] member(s) [With Significant Other] : lives with significant other [] :  [Retired] : retired [College] : College [# Of Children ___] : has [unfilled] children [Feels Safe at Home] : Feels safe at home [Fully functional (bathing, dressing, toileting, transferring, walking, feeding)] : Fully functional (bathing, dressing, toileting, transferring, walking, feeding) [Fully functional (using the telephone, shopping, preparing meals, housekeeping, doing laundry, using] : Fully functional and needs no help or supervision to perform IADLs (using the telephone, shopping, preparing meals, housekeeping, doing laundry, using transportation, managing medications and managing finances) [Smoke Detector] : smoke detector [Carbon Monoxide Detector] : carbon monoxide detector [Seat Belt] :  uses seat belt [Safety elements used in home] : safety elements used in home [Sunscreen] : uses sunscreen [] : No [Audit-CScore] : 3 [de-identified] : Walking [de-identified] : Healthy [WOW2Nizdt] : 0 [Change in mental status noted] : No change in mental status noted [Behavior] : denies difficulty with behavior [Language] : denies difficulty with language [Handling Complex Tasks] : denies difficulty handling complex tasks [Learning/Retaining New Information] : denies difficulty learning/retaining new information [Spatial Ability and Orientation] : denies difficulty with spatial ability and orientation [Sexually Active] : not sexually active [High Risk Behavior] : no high risk behavior [Reasoning] : denies difficulty with reasoning [Reports changes in vision] : Reports no changes in vision [Reports changes in hearing] : Reports no changes in hearing [Reports normal functional visual acuity (ie: able to read med bottle)] : Reports poor functional visual acuity.  [Reports changes in dental health] : Reports no changes in dental health [Guns at Home] : no guns at home [TB Exposure] : is not being exposed to tuberculosis [Caregiver Concerns] : does not have caregiver concerns [Travel to Developing Areas] : does not  travel to developing areas [BoneDensityDate] : 8/19 [MammogramDate] : 12/19 [ColonoscopyDate] : 3/19

## 2020-06-18 NOTE — HISTORY OF PRESENT ILLNESS
[Spouse] : spouse [de-identified] : The patient is a 79-year-old female with history of noncritical coronary disease, mitral valve prolapse mild, mild carotid plaques, Gerd, anxiety, prediabetes, vitamin D deficiency, hearing loss, history of colonic polyps and seasonal allergies who presents for follow-up patient feels well recently scratched left cornea and was told had elevated pressures in the right denies blurred vision but has float his left eye no nausea vomiting constipation chest pain palpitations fever chills quarantine herself with her  at home. [FreeTextEntry1] : Comprehensive annual physical examination

## 2020-06-18 NOTE — ASSESSMENT
[FreeTextEntry1] : Patient is a 79-year-old female with history of mild carotid artery disease, minimal coronary disease, mild mitral regurgitation, history of Gerd, history of colonic polyps, borderline low vitamin D level, prediabetes, anxiety, elevated pressure in left eye, who presents for comprehensive annual physical examination complains of anxiety insomnia and itchy skin\par \par 1. Weight loss\par May be secondary anxiety consider treatment\par check routine labs thyroid CBC liver function test of continues to weight lose weight consider CT\par \par 2. Anxiety/insomnia\par consider starting Zoloft 25 mg QHS observe\par \par 3 mitral valve regurgitation mild\par no LVH on EKG doing well follow-up with cardiology\par \par 4. Itchy skin may be secondary dryness dryness\par moisturize short showers consider referral to dermatology\par \par 5 mild coronary disease/mild plaque buildup\par check lipid profile counseled consider follow-up with cardiology.\par \par 6 mild vitamin D\par check level continue to supplement vitamin D four units a day\par \par 7. Gerd\par improved off omeprazole lifestyle modifications\par \par 8 health maintenance\par COVID testing today\par mammogram in December\par bone density up-to-date colonoscopy up-to-date\par check routine labs\par follow-up in 4 to 6 weeks.

## 2020-06-19 LAB
25(OH)D3 SERPL-MCNC: 23 NG/ML
ALBUMIN SERPL ELPH-MCNC: 4.5 G/DL
ALP BLD-CCNC: 81 U/L
ALT SERPL-CCNC: 21 U/L
ANION GAP SERPL CALC-SCNC: 14 MMOL/L
AST SERPL-CCNC: 27 U/L
BASOPHILS # BLD AUTO: 0.05 K/UL
BASOPHILS NFR BLD AUTO: 0.8 %
BILIRUB SERPL-MCNC: 0.2 MG/DL
BUN SERPL-MCNC: 15 MG/DL
CALCIUM SERPL-MCNC: 9.5 MG/DL
CHLORIDE SERPL-SCNC: 103 MMOL/L
CHOLEST SERPL-MCNC: 234 MG/DL
CHOLEST/HDLC SERPL: 2.6 RATIO
CO2 SERPL-SCNC: 24 MMOL/L
CREAT SERPL-MCNC: 0.52 MG/DL
EOSINOPHIL # BLD AUTO: 0.26 K/UL
EOSINOPHIL NFR BLD AUTO: 4.3 %
ERYTHROCYTE [SEDIMENTATION RATE] IN BLOOD BY WESTERGREN METHOD: 5 MM/HR
ESTIMATED AVERAGE GLUCOSE: 114 MG/DL
GLUCOSE SERPL-MCNC: 95 MG/DL
HBA1C MFR BLD HPLC: 5.6 %
HCT VFR BLD CALC: 42.3 %
HDLC SERPL-MCNC: 88 MG/DL
HGB BLD-MCNC: 12.8 G/DL
IMM GRANULOCYTES NFR BLD AUTO: 0.2 %
LDLC SERPL CALC-MCNC: 133 MG/DL
LYMPHOCYTES # BLD AUTO: 1.6 K/UL
LYMPHOCYTES NFR BLD AUTO: 26.3 %
MAN DIFF?: NORMAL
MCHC RBC-ENTMCNC: 28 PG
MCHC RBC-ENTMCNC: 30.3 GM/DL
MCV RBC AUTO: 92.6 FL
MONOCYTES # BLD AUTO: 0.49 K/UL
MONOCYTES NFR BLD AUTO: 8.1 %
NEUTROPHILS # BLD AUTO: 3.67 K/UL
NEUTROPHILS NFR BLD AUTO: 60.3 %
PLATELET # BLD AUTO: 186 K/UL
POTASSIUM SERPL-SCNC: 4.4 MMOL/L
PROT SERPL-MCNC: 6.3 G/DL
RBC # BLD: 4.57 M/UL
RBC # FLD: 15 %
SARS-COV-2 IGG SERPL IA-ACNC: <0.1 INDEX
SARS-COV-2 IGG SERPL QL IA: NEGATIVE
SODIUM SERPL-SCNC: 142 MMOL/L
TRIGL SERPL-MCNC: 60 MG/DL
TSH SERPL-ACNC: 0.91 UIU/ML
WBC # FLD AUTO: 6.08 K/UL

## 2020-06-23 RX ORDER — SERTRALINE 25 MG/1
25 TABLET, FILM COATED ORAL
Qty: 90 | Refills: 3 | Status: DISCONTINUED | COMMUNITY
Start: 2020-06-18 | End: 2020-06-23

## 2020-06-30 ENCOUNTER — APPOINTMENT (OUTPATIENT)
Dept: CARDIOLOGY | Facility: CLINIC | Age: 80
End: 2020-06-30
Payer: MEDICARE

## 2020-06-30 ENCOUNTER — NON-APPOINTMENT (OUTPATIENT)
Age: 80
End: 2020-06-30

## 2020-06-30 VITALS
HEART RATE: 62 BPM | WEIGHT: 119 LBS | HEIGHT: 66 IN | SYSTOLIC BLOOD PRESSURE: 127 MMHG | BODY MASS INDEX: 19.13 KG/M2 | DIASTOLIC BLOOD PRESSURE: 82 MMHG | RESPIRATION RATE: 15 BRPM

## 2020-06-30 PROCEDURE — 99213 OFFICE O/P EST LOW 20 MIN: CPT

## 2020-06-30 PROCEDURE — 93000 ELECTROCARDIOGRAM COMPLETE: CPT

## 2020-06-30 NOTE — DISCUSSION/SUMMARY
[FreeTextEntry1] : This is a 79-year-old female past medical history is that of a murmur, hyperlipidemia, minimal coronary disease, who comes in for followup cardiac evaluation.\par The patient denies chest pain, shortness of breath, dizziness or syncope.\par Electrocardiogram done June 30, 2020 demonstrated normal sinus rhythm rate 60 bpm is otherwise unremarkable.\par The patient main stable from a cardiac standpoint.  She will follow-up with her primary care physician.\par She will followup with you regarding her overall medical care.  She will followup with me in 4 months.\par The patient had a normal  stress test November 16 2018. \par The patient understands that aerobic exercises must be increased to 40 minutes 4 times per week. A detailed discussion of lifestyle modification was done today. The patient has a good understanding of the diagnosis, and treatment plan. Lifestyle modification was also outlined.

## 2020-06-30 NOTE — REASON FOR VISIT
[Follow-Up - Clinic] : a clinic follow-up of [Mitral Regurgitation] : mitral regurgitation [Hyperlipidemia] : hyperlipidemia [FreeTextEntry1] : 79-year-old female past medical history is that of a murmur, hyperlipidemia, minimal coronary disease, who comes in for followup cardiac evaluation. Pt states that the only time she gets mid sternal chest pain is when she has Acid Reflux. Pt takes Omeprazole PRN. Otherwise Pt denies CP, SOB, VALADEZ, dizziness.

## 2020-06-30 NOTE — PHYSICAL EXAM
[Normal Appearance] : normal appearance [General Appearance - Well Developed] : well developed [Well Groomed] : well groomed [General Appearance - Well Nourished] : well nourished [No Deformities] : no deformities [General Appearance - In No Acute Distress] : no acute distress [Normal Conjunctiva] : the conjunctiva exhibited no abnormalities [Normal Oral Mucosa] : normal oral mucosa [Normal Oropharynx] : normal oropharynx [Normal Jugular Venous A Waves Present] : normal jugular venous A waves present [No Jugular Venous Feldman A Waves] : no jugular venous feldman A waves [Normal Jugular Venous V Waves Present] : normal jugular venous V waves present [Respiration, Rhythm And Depth] : normal respiratory rhythm and effort [Exaggerated Use Of Accessory Muscles For Inspiration] : no accessory muscle use [Bowel Sounds] : normal bowel sounds [Auscultation Breath Sounds / Voice Sounds] : lungs were clear to auscultation bilaterally [Abdomen Soft] : soft [Abnormal Walk] : normal gait [Nail Clubbing] : no clubbing of the fingernails [Cyanosis, Localized] : no localized cyanosis [Skin Color & Pigmentation] : normal skin color and pigmentation [Skin Turgor] : normal skin turgor [] : no rash [Oriented To Time, Place, And Person] : oriented to person, place, and time [Impaired Insight] : insight and judgment were intact [No Anxiety] : not feeling anxious [5th Left ICS - MCL] : palpated at the 5th LICS in the midclavicular line [No Precordial Heave] : no precordial heave was noted [Normal] : normal [Normal Rate] : normal [Rhythm Regular] : regular [Normal S1] : normal S1 [Normal S2] : normal S2 [No Gallop] : no gallop heard [II] : a grade 2 [I] : a grade 1 [2+] : left 2+ [No Pitting Edema] : no pitting edema present [No Abnormalities] : the abdominal aorta was not enlarged and no bruit was heard [Apical Thrill] : no thrill palpable at the apex [S3] : no S3 [S4] : no S4 [Click] : no click [Pericardial Rub] : no pericardial rub [Right Carotid Bruit] : no bruit heard over the right carotid [Left Carotid Bruit] : no bruit heard over the left carotid [Right Femoral Bruit] : no bruit heard over the right femoral artery [Left Femoral Bruit] : no bruit heard over the left femoral artery

## 2020-07-14 ENCOUNTER — APPOINTMENT (OUTPATIENT)
Dept: INTERNAL MEDICINE | Facility: CLINIC | Age: 80
End: 2020-07-14

## 2020-08-13 ENCOUNTER — APPOINTMENT (OUTPATIENT)
Dept: INTERNAL MEDICINE | Facility: CLINIC | Age: 80
End: 2020-08-13

## 2020-09-15 ENCOUNTER — APPOINTMENT (OUTPATIENT)
Dept: INTERNAL MEDICINE | Facility: CLINIC | Age: 80
End: 2020-09-15
Payer: MEDICARE

## 2020-09-15 PROCEDURE — G0008: CPT

## 2020-09-15 PROCEDURE — 90662 IIV NO PRSV INCREASED AG IM: CPT

## 2020-09-18 ENCOUNTER — APPOINTMENT (OUTPATIENT)
Dept: OTOLARYNGOLOGY | Facility: CLINIC | Age: 80
End: 2020-09-18
Payer: MEDICARE

## 2020-09-18 VITALS
TEMPERATURE: 97.6 F | SYSTOLIC BLOOD PRESSURE: 148 MMHG | HEIGHT: 66 IN | DIASTOLIC BLOOD PRESSURE: 84 MMHG | WEIGHT: 120 LBS | HEART RATE: 62 BPM | BODY MASS INDEX: 19.29 KG/M2

## 2020-09-18 PROCEDURE — 31575 DIAGNOSTIC LARYNGOSCOPY: CPT

## 2020-09-18 PROCEDURE — 99204 OFFICE O/P NEW MOD 45 MIN: CPT | Mod: 25

## 2020-09-18 NOTE — REVIEW OF SYSTEMS
[Hoarseness] : hoarseness [Throat Clearing] : throat clearing [Negative] : Heme/Lymph [de-identified] : rhinitis

## 2020-09-18 NOTE — ASSESSMENT
[FreeTextEntry1] : 80-year-old female history of reflux taking any medications last 8 months feeling a little raspy here for evaluation fiberoptic evaluation of her larynx shows no tumors masses polyps good vocal cord function but some edema of her arytenoids consistent with reflux most likely the etiology of her hoarseness.  I have encouraged her to take her medications omeprazole every day for a month and see if the fully expecting her voice to improve and then determine with her internist the pros and cons of staying on omeprazole moving forward.

## 2020-09-18 NOTE — CONSULT LETTER
[Dear  ___] : Dear  [unfilled], [Consult Letter:] : I had the pleasure of evaluating your patient, [unfilled]. [Please see my note below.] : Please see my note below. [Consult Closing:] : Thank you very much for allowing me to participate in the care of this patient.  If you have any questions, please do not hesitate to contact me. [Sincerely,] : Sincerely, [FreeTextEntry3] : Flash Aguila MD\par [facility]\par Otolaryngology and Facial Plastics\par Associated Professor, [employer]\par

## 2020-09-18 NOTE — HISTORY OF PRESENT ILLNESS
[de-identified] : Patient has a history of acid reflux and over the last 8 months has had recurring hoarseness. She takes omeprazole for reflux but only when she feels the reflux is bad. SHe does not recall her last endoscopy. SHe has to clear her throat frequently to move thin minor amounts of mucus from the throat. SHe feels like her nose has been very runny with clear mucus recently as well.  She does not have any pain in the throat and does not have any issues eating drinking or swallowing. She does not have a history of smoking. She has not lost her voice completely over the last 8 months. When she takes pills she does feel like they are hard to get down but this is normal for her and has always had this issue. SHe does not have any nasal congestion or runny nose.

## 2020-11-16 ENCOUNTER — APPOINTMENT (OUTPATIENT)
Dept: INTERNAL MEDICINE | Facility: CLINIC | Age: 80
End: 2020-11-16
Payer: MEDICARE

## 2020-11-16 ENCOUNTER — NON-APPOINTMENT (OUTPATIENT)
Age: 80
End: 2020-11-16

## 2020-11-16 VITALS — SYSTOLIC BLOOD PRESSURE: 140 MMHG | DIASTOLIC BLOOD PRESSURE: 80 MMHG

## 2020-11-16 DIAGNOSIS — R06.00 DYSPNEA, UNSPECIFIED: ICD-10-CM

## 2020-11-16 DIAGNOSIS — Z11.59 ENCOUNTER FOR SCREENING FOR OTHER VIRAL DISEASES: ICD-10-CM

## 2020-11-16 DIAGNOSIS — A49.01 METHICILLIN SUSCEPTIBLE STAPHYLOCOCCUS AUREUS INFECTION, UNSPECIFIED SITE: ICD-10-CM

## 2020-11-16 DIAGNOSIS — J32.0 CHRONIC MAXILLARY SINUSITIS: ICD-10-CM

## 2020-11-16 LAB
BILIRUB UR QL STRIP: NORMAL
CLARITY UR: CLEAR
COLLECTION METHOD: NORMAL
GLUCOSE UR-MCNC: NORMAL
HCG UR QL: 0.2 EU/DL
HGB UR QL STRIP.AUTO: NORMAL
KETONES UR-MCNC: NORMAL
LEUKOCYTE ESTERASE UR QL STRIP: NORMAL
NITRITE UR QL STRIP: NORMAL
PH UR STRIP: 7.5
PROT UR STRIP-MCNC: NORMAL
SP GR UR STRIP: 1.02

## 2020-11-16 PROCEDURE — 93000 ELECTROCARDIOGRAM COMPLETE: CPT

## 2020-11-16 PROCEDURE — 81003 URINALYSIS AUTO W/O SCOPE: CPT | Mod: QW

## 2020-11-16 PROCEDURE — 36415 COLL VENOUS BLD VENIPUNCTURE: CPT

## 2020-11-16 PROCEDURE — 99214 OFFICE O/P EST MOD 30 MIN: CPT | Mod: 25

## 2020-11-16 NOTE — HISTORY OF PRESENT ILLNESS
[FreeTextEntry1] : Complaint of persistent bloating weight loss and follow-up for reflux [de-identified] : The patient is a 80-year-old female with history of mild aortic regurgitation/mitral regurgitation, calcified granuloma, anxiety, hypercholesterolemia, prediabetes, osteopenia, vitamin D deficiency who presents for follow-up of weight loss and bloating/reflux. Patient complains of anxiety under stress getting older  89 years old. Some insomnia but complains about persistent weight loss about 10 pounds over one year associated with bloating no benefit with omeprazole 40 mg ENT showed some erythema of the pharynx. She denies chest pain, palpitations, nausea vomiting, diarrhea, melena, bright red blood per rectum dysuria.

## 2020-11-16 NOTE — ASSESSMENT
[FreeTextEntry1] : The patient is a 80-year-old female with history of anxiety, calcified granuloma the lung, Gerd, hyperlipidemia osteoporosis tenia, prediabetes hearing loss, vitamin D insufficiency who presents for follow-up of Gerd and complaining of persistent weight loss bloating\par \par 1 weight loss bloating\par etiology unclear EKG normal sinus rhythm no ischemic changes\par concerned about possible cardiac etiology versus anxiety versus G.I.\par we'll talk with cardiology, check labs CBC can profile H. pylori consider endoscopy\par start Gas-X continue omeprazole 40 mg.\par \par 2. Anxiety\par patient under stress will try Lexapro 5 mg half tablet again\par \par 3. Gerd\par continue lifestyle modifications omeprazole 40 mg\par status post ENT evaluation consistent with Gerd

## 2020-11-16 NOTE — REVIEW OF SYSTEMS
[Recent Change In Weight] : ~T recent weight change [Abdominal Pain] : abdominal pain [Diarrhea] : diarrhea [Vomiting] : vomiting [Heartburn] : heartburn [Insomnia] : insomnia [Anxiety] : anxiety [Negative] : Heme/Lymph [Fever] : no fever [Chills] : no chills [Fatigue] : no fatigue [Hot Flashes] : no hot flashes [Night Sweats] : no night sweats [Nausea] : no nausea [Constipation] : no constipation [Melena] : no melena [Suicidal] : not suicidal [Depression] : no depression

## 2020-11-17 LAB
ALBUMIN SERPL ELPH-MCNC: 4.5 G/DL
ALP BLD-CCNC: 94 U/L
ALT SERPL-CCNC: 20 U/L
ANION GAP SERPL CALC-SCNC: 10 MMOL/L
APPEARANCE: CLEAR
AST SERPL-CCNC: 21 U/L
BACTERIA: NEGATIVE
BASOPHILS # BLD AUTO: 0.05 K/UL
BASOPHILS NFR BLD AUTO: 0.8 %
BILIRUB SERPL-MCNC: 0.2 MG/DL
BILIRUBIN URINE: NEGATIVE
BLOOD URINE: NEGATIVE
BUN SERPL-MCNC: 19 MG/DL
CALCIUM SERPL-MCNC: 9.6 MG/DL
CHLORIDE SERPL-SCNC: 103 MMOL/L
CO2 SERPL-SCNC: 27 MMOL/L
COLOR: NORMAL
CREAT SERPL-MCNC: 0.53 MG/DL
EOSINOPHIL # BLD AUTO: 0.21 K/UL
EOSINOPHIL NFR BLD AUTO: 3.5 %
GLUCOSE QUALITATIVE U: NEGATIVE
GLUCOSE SERPL-MCNC: 100 MG/DL
HCT VFR BLD CALC: 42.6 %
HGB BLD-MCNC: 12.8 G/DL
HYALINE CASTS: 0 /LPF
IMM GRANULOCYTES NFR BLD AUTO: 0.2 %
KETONES URINE: NEGATIVE
LEUKOCYTE ESTERASE URINE: NEGATIVE
LYMPHOCYTES # BLD AUTO: 1.44 K/UL
LYMPHOCYTES NFR BLD AUTO: 23.9 %
MAN DIFF?: NORMAL
MCHC RBC-ENTMCNC: 27.7 PG
MCHC RBC-ENTMCNC: 30 GM/DL
MCV RBC AUTO: 92.2 FL
MICROSCOPIC-UA: NORMAL
MONOCYTES # BLD AUTO: 0.47 K/UL
MONOCYTES NFR BLD AUTO: 7.8 %
NEUTROPHILS # BLD AUTO: 3.85 K/UL
NEUTROPHILS NFR BLD AUTO: 63.8 %
NITRITE URINE: NEGATIVE
PH URINE: 7
PLATELET # BLD AUTO: 196 K/UL
POTASSIUM SERPL-SCNC: 4.5 MMOL/L
PROT SERPL-MCNC: 6.4 G/DL
PROTEIN URINE: NEGATIVE
RBC # BLD: 4.62 M/UL
RBC # FLD: 15 %
RED BLOOD CELLS URINE: 3 /HPF
SODIUM SERPL-SCNC: 140 MMOL/L
SPECIFIC GRAVITY URINE: 1.02
SQUAMOUS EPITHELIAL CELLS: 1 /HPF
TSH SERPL-ACNC: 0.63 UIU/ML
UROBILINOGEN URINE: NORMAL
WBC # FLD AUTO: 6.03 K/UL
WHITE BLOOD CELLS URINE: 4 /HPF

## 2020-11-18 ENCOUNTER — NON-APPOINTMENT (OUTPATIENT)
Age: 80
End: 2020-11-18

## 2020-11-18 LAB
BACTERIA UR CULT: NORMAL
H PYLORI AB SER-ACNC: 15.9 UNITS
H PYLORI IGA SER-ACNC: 5.2 UNITS

## 2020-11-19 ENCOUNTER — APPOINTMENT (OUTPATIENT)
Dept: CARDIOLOGY | Facility: CLINIC | Age: 80
End: 2020-11-19
Payer: MEDICARE

## 2020-11-19 ENCOUNTER — APPOINTMENT (OUTPATIENT)
Dept: DERMATOLOGY | Facility: CLINIC | Age: 80
End: 2020-11-19
Payer: MEDICARE

## 2020-11-19 PROCEDURE — 93015 CV STRESS TEST SUPVJ I&R: CPT

## 2020-11-19 PROCEDURE — A9500: CPT

## 2020-11-19 PROCEDURE — 17000 DESTRUCT PREMALG LESION: CPT | Mod: 59

## 2020-11-19 PROCEDURE — 17003 DESTRUCT PREMALG LES 2-14: CPT | Mod: 59

## 2020-11-19 PROCEDURE — 17110 DESTRUCTION B9 LES UP TO 14: CPT

## 2020-11-19 PROCEDURE — 99214 OFFICE O/P EST MOD 30 MIN: CPT | Mod: 25

## 2020-11-19 PROCEDURE — 78452 HT MUSCLE IMAGE SPECT MULT: CPT

## 2020-11-23 ENCOUNTER — APPOINTMENT (OUTPATIENT)
Dept: CARDIOLOGY | Facility: CLINIC | Age: 80
End: 2020-11-23
Payer: MEDICARE

## 2020-11-23 ENCOUNTER — NON-APPOINTMENT (OUTPATIENT)
Age: 80
End: 2020-11-23

## 2020-11-23 VITALS
HEIGHT: 66 IN | DIASTOLIC BLOOD PRESSURE: 78 MMHG | TEMPERATURE: 98 F | RESPIRATION RATE: 16 BRPM | BODY MASS INDEX: 18.64 KG/M2 | SYSTOLIC BLOOD PRESSURE: 128 MMHG | HEART RATE: 84 BPM | WEIGHT: 116 LBS

## 2020-11-23 PROCEDURE — 93306 TTE W/DOPPLER COMPLETE: CPT

## 2020-11-23 PROCEDURE — 99214 OFFICE O/P EST MOD 30 MIN: CPT

## 2020-11-23 PROCEDURE — 93000 ELECTROCARDIOGRAM COMPLETE: CPT

## 2020-11-23 PROCEDURE — 93880 EXTRACRANIAL BILAT STUDY: CPT

## 2020-11-23 RX ORDER — OMEPRAZOLE 20 MG/1
20 CAPSULE, DELAYED RELEASE ORAL
Refills: 0 | Status: DISCONTINUED | COMMUNITY
End: 2020-11-23

## 2020-12-22 ENCOUNTER — APPOINTMENT (OUTPATIENT)
Dept: INTERNAL MEDICINE | Facility: CLINIC | Age: 80
End: 2020-12-22
Payer: MEDICARE

## 2020-12-22 VITALS
TEMPERATURE: 98.3 F | DIASTOLIC BLOOD PRESSURE: 90 MMHG | WEIGHT: 118 LBS | BODY MASS INDEX: 18.96 KG/M2 | HEIGHT: 66 IN | HEART RATE: 98 BPM | SYSTOLIC BLOOD PRESSURE: 130 MMHG | OXYGEN SATURATION: 98 %

## 2020-12-22 DIAGNOSIS — R09.89 OTHER SPECIFIED SYMPTOMS AND SIGNS INVOLVING THE CIRCULATORY AND RESPIRATORY SYSTEMS: ICD-10-CM

## 2020-12-22 PROCEDURE — 99214 OFFICE O/P EST MOD 30 MIN: CPT

## 2020-12-22 NOTE — REVIEW OF SYSTEMS
[Fever] : no fever [Chills] : no chills [Fatigue] : no fatigue [Hot Flashes] : no hot flashes [Night Sweats] : no night sweats [Palpitations] : no palpitations [Leg Claudication] : no leg claudication [Lower Ext Edema] : no lower extremity edema [Orthopnea] : no orthopnea [Paroxysmal Nocturnal Dyspnea] : no paroxysmal nocturnal dyspnea [Nausea] : no nausea [Constipation] : no constipation [Diarrhea] : diarrhea [Vomiting] : no vomiting [Heartburn] : no heartburn [Melena] : no melena [Suicidal] : not suicidal [Anxiety] : no anxiety [Depression] : no depression

## 2020-12-22 NOTE — ASSESSMENT
[FreeTextEntry1] : Patient is a 80-year-old female with history of hypercholesterolemia low vitamin D mild aortic regurgitation mild mitral regurgitation, osteopenia, prediabetes seasonal allergies, vitamin D deficiency. Loss who presents for persistent bloating/atypical chest pain and weight loss\par \par 1. Atypical chest pain/bloating/weight loss\par most likely secondary to stress cardiac workup negative including chemical stress, echo, carotid Doppler\par will refer to G.I. for evaluation of possible Gerd esophagitis/gastritis\par continue omeprazole and Gas-X\par follow-up in 2 to 3 months\par \par 2. Anxiety\par improved however under a lot of stress caring for elderly  isolated due to COVID\par observe for now DC Lexapro\par \par 3. History of reflux disease\par H. pylori negative continue omeprazole follow-up with G.I.\par \par 4. History of carotid atherosclerosis\par on carotid Doppler's no significant stenosis noted\par observe\par

## 2020-12-22 NOTE — HISTORY OF PRESENT ILLNESS
[FreeTextEntry1] : Follow-up for anxiety/mood changes, persistent belching/atypical chest pain and some weight loss [de-identified] : The patient is a 80-year-old female with history of some anxiety, abdominal bloating/uncomfortableness, carotid artery disease mild, dense breasts, Gerd, hearing loss, hypercholesterolemia low vitamin D mild aortic regurgitation/mitral Gerd Tatian osteopenia prediabetes seasonal allergies and vitamin D insufficiency who presents for follow-up of some atypical chest pain/belching and weight loss patient notes some improvement in mood tried Lexapro with no improvement some fogginess D/C notes marked improvement in mood however notes persistent belching/atypical chest pain patient was worked up with cardiology stress test negative carotid artery disease

## 2020-12-23 ENCOUNTER — APPOINTMENT (OUTPATIENT)
Dept: CARDIOLOGY | Facility: CLINIC | Age: 80
End: 2020-12-23
Payer: MEDICARE

## 2020-12-23 VITALS
SYSTOLIC BLOOD PRESSURE: 132 MMHG | WEIGHT: 117 LBS | DIASTOLIC BLOOD PRESSURE: 84 MMHG | HEIGHT: 66 IN | HEART RATE: 84 BPM | TEMPERATURE: 97.3 F | BODY MASS INDEX: 18.8 KG/M2 | RESPIRATION RATE: 16 BRPM

## 2020-12-23 PROCEDURE — 99213 OFFICE O/P EST LOW 20 MIN: CPT

## 2020-12-28 RX ORDER — ESCITALOPRAM OXALATE 5 MG/1
5 TABLET ORAL DAILY
Qty: 14 | Refills: 1 | Status: DISCONTINUED | COMMUNITY
Start: 2020-11-16 | End: 2020-12-28

## 2020-12-31 ENCOUNTER — APPOINTMENT (OUTPATIENT)
Dept: GASTROENTEROLOGY | Facility: CLINIC | Age: 80
End: 2020-12-31
Payer: MEDICARE

## 2020-12-31 VITALS
HEIGHT: 66 IN | RESPIRATION RATE: 16 BRPM | TEMPERATURE: 97.3 F | WEIGHT: 121 LBS | BODY MASS INDEX: 19.44 KG/M2 | SYSTOLIC BLOOD PRESSURE: 124 MMHG | HEART RATE: 96 BPM | DIASTOLIC BLOOD PRESSURE: 72 MMHG | OXYGEN SATURATION: 95 %

## 2020-12-31 PROCEDURE — 99204 OFFICE O/P NEW MOD 45 MIN: CPT

## 2020-12-31 NOTE — ASSESSMENT
[FreeTextEntry1] : 80-year-old female acid reflux, dysphagia, cannot rule out esophagitis, ring, stricture, neoplasm\par \par Plan\par Above discussed in detail with patient\par Expresses good understanding of my clinical impression\par Indications risks benefits and alternatives to upper endoscopy reviewed with patient\par She is agreeable to examination\par Patient is not interested in therapeutic trial of proton pump inhibitor therapy at this time

## 2020-12-31 NOTE — PHYSICAL EXAM
[General Appearance - Alert] : alert [General Appearance - In No Acute Distress] : in no acute distress [Sclera] : the sclera and conjunctiva were normal [PERRL With Normal Accommodation] : pupils were equal in size, round, and reactive to light [Extraocular Movements] : extraocular movements were intact [Outer Ear] : the ears and nose were normal in appearance [Oropharynx] : the oropharynx was normal [Neck Appearance] : the appearance of the neck was normal [Neck Cervical Mass (___cm)] : no neck mass was observed [Jugular Venous Distention Increased] : there was no jugular-venous distention [Thyroid Diffuse Enlargement] : the thyroid was not enlarged [Thyroid Nodule] : there were no palpable thyroid nodules [Auscultation Breath Sounds / Voice Sounds] : lungs were clear to auscultation bilaterally [Heart Rate And Rhythm] : heart rate was normal and rhythm regular [Heart Sounds] : normal S1 and S2 [Heart Sounds Gallop] : no gallops [Murmurs] : no murmurs [Heart Sounds Pericardial Friction Rub] : no pericardial rub [Edema] : there was no peripheral edema [Bowel Sounds] : normal bowel sounds [Abdomen Soft] : soft [Abdomen Tenderness] : non-tender [Cervical Lymph Nodes Enlarged Posterior Bilaterally] : posterior cervical [Cervical Lymph Nodes Enlarged Anterior Bilaterally] : anterior cervical [Supraclavicular Lymph Nodes Enlarged Bilaterally] : supraclavicular [Axillary Lymph Nodes Enlarged Bilaterally] : axillary [Femoral Lymph Nodes Enlarged Bilaterally] : femoral [Inguinal Lymph Nodes Enlarged Bilaterally] : inguinal [No CVA Tenderness] : no ~M costovertebral angle tenderness [No Spinal Tenderness] : no spinal tenderness [Abnormal Walk] : normal gait [Nail Clubbing] : no clubbing  or cyanosis of the fingernails [Musculoskeletal - Swelling] : no joint swelling seen [Motor Tone] : muscle strength and tone were normal [Skin Color & Pigmentation] : normal skin color and pigmentation [Skin Turgor] : normal skin turgor [] : no rash [Oriented To Time, Place, And Person] : oriented to person, place, and time [Impaired Insight] : insight and judgment were intact [Affect] : the affect was normal [FreeTextEntry1] : Scars

## 2020-12-31 NOTE — HISTORY OF PRESENT ILLNESS
[de-identified] : 80-year-old female, known history of acid reflux presents with complaints of dysphagia\par Denies any episode of food bolus impaction\par Sticking sensation with solid foods in the mid chest\par Denies vomiting\par Last upper endoscopy many years ago\par Longstanding history of acid reflux but not using any medication at this time\par Previously prescribed omeprazole with little benefit\par Recent complaints of fullness of the chest, difficulty belching, complaints improved with belching after drinking tonic water\par Also complains of "raspy" voice\par Recent ears nose throat evaluation laryngoscopy, findings consistent with reflux related changes\par \par Social history non-smoker

## 2021-01-23 ENCOUNTER — APPOINTMENT (OUTPATIENT)
Dept: DISASTER EMERGENCY | Facility: CLINIC | Age: 81
End: 2021-01-23

## 2021-01-25 LAB — SARS-COV-2 N GENE NPH QL NAA+PROBE: NOT DETECTED

## 2021-01-28 ENCOUNTER — APPOINTMENT (OUTPATIENT)
Dept: DERMATOLOGY | Facility: CLINIC | Age: 81
End: 2021-01-28

## 2021-02-13 ENCOUNTER — APPOINTMENT (OUTPATIENT)
Dept: DISASTER EMERGENCY | Facility: CLINIC | Age: 81
End: 2021-02-13

## 2021-02-14 LAB — SARS-COV-2 N GENE NPH QL NAA+PROBE: NOT DETECTED

## 2021-02-17 ENCOUNTER — APPOINTMENT (OUTPATIENT)
Dept: GASTROENTEROLOGY | Facility: AMBULATORY MEDICAL SERVICES | Age: 81
End: 2021-02-17
Payer: MEDICARE

## 2021-02-17 PROCEDURE — 43239 EGD BIOPSY SINGLE/MULTIPLE: CPT

## 2021-02-23 ENCOUNTER — NON-APPOINTMENT (OUTPATIENT)
Age: 81
End: 2021-02-23

## 2021-03-10 NOTE — REVIEW OF SYSTEMS
[Recent Change In Weight] : ~T recent weight change 10-Mar-2021 12:50 [Vision Problems] : vision problems [Itching] : Itching [Insomnia] : insomnia [Anxiety] : anxiety [Negative] : Heme/Lymph [Fever] : no fever [Chills] : no chills [Discharge] : no discharge [Night Sweats] : no night sweats [Fatigue] : no fatigue [Hot Flashes] : no hot flashes [Redness] : no redness [Pain] : no pain [Dryness] : no dryness  [Itching] : no itching [Nail Changes] : no nail changes [Mole Changes] : no mole changes [Skin Rash] : no skin rash [Hair Changes] : no hair changes [Depression] : no depression [Suicidal] : not suicidal

## 2021-03-15 ENCOUNTER — OUTPATIENT (OUTPATIENT)
Dept: OUTPATIENT SERVICES | Facility: HOSPITAL | Age: 81
LOS: 1 days | End: 2021-03-15
Payer: MEDICARE

## 2021-03-15 ENCOUNTER — RESULT REVIEW (OUTPATIENT)
Age: 81
End: 2021-03-15

## 2021-03-15 ENCOUNTER — APPOINTMENT (OUTPATIENT)
Dept: CT IMAGING | Facility: CLINIC | Age: 81
End: 2021-03-15
Payer: MEDICARE

## 2021-03-15 DIAGNOSIS — Z41.9 ENCOUNTER FOR PROCEDURE FOR PURPOSES OTHER THAN REMEDYING HEALTH STATE, UNSPECIFIED: Chronic | ICD-10-CM

## 2021-03-15 DIAGNOSIS — K66.0 PERITONEAL ADHESIONS (POSTPROCEDURAL) (POSTINFECTION): Chronic | ICD-10-CM

## 2021-03-15 DIAGNOSIS — Z90.710 ACQUIRED ABSENCE OF BOTH CERVIX AND UTERUS: Chronic | ICD-10-CM

## 2021-03-15 DIAGNOSIS — Z98.89 OTHER SPECIFIED POSTPROCEDURAL STATES: Chronic | ICD-10-CM

## 2021-03-15 DIAGNOSIS — Z98.41 CATARACT EXTRACTION STATUS, RIGHT EYE: Chronic | ICD-10-CM

## 2021-03-15 DIAGNOSIS — Z12.11 ENCOUNTER FOR SCREENING FOR MALIGNANT NEOPLASM OF COLON: ICD-10-CM

## 2021-03-15 PROCEDURE — 74263 CT COLONOGRAPHY SCREENING: CPT | Mod: 26

## 2021-03-15 PROCEDURE — 74263 CT COLONOGRAPHY SCREENING: CPT

## 2021-03-17 ENCOUNTER — NON-APPOINTMENT (OUTPATIENT)
Age: 81
End: 2021-03-17

## 2021-03-22 ENCOUNTER — OUTPATIENT (OUTPATIENT)
Dept: OUTPATIENT SERVICES | Facility: HOSPITAL | Age: 81
LOS: 1 days | End: 2021-03-22
Payer: MEDICARE

## 2021-03-22 ENCOUNTER — APPOINTMENT (OUTPATIENT)
Dept: RADIOLOGY | Facility: CLINIC | Age: 81
End: 2021-03-22
Payer: MEDICARE

## 2021-03-22 ENCOUNTER — APPOINTMENT (OUTPATIENT)
Dept: INTERNAL MEDICINE | Facility: CLINIC | Age: 81
End: 2021-03-22
Payer: MEDICARE

## 2021-03-22 VITALS
BODY MASS INDEX: 19.53 KG/M2 | DIASTOLIC BLOOD PRESSURE: 73 MMHG | HEART RATE: 82 BPM | OXYGEN SATURATION: 99 % | SYSTOLIC BLOOD PRESSURE: 135 MMHG | WEIGHT: 123 LBS | TEMPERATURE: 97.8 F | HEIGHT: 66.5 IN
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
## 2021-03-22 DIAGNOSIS — R07.81 PLEURODYNIA: ICD-10-CM

## 2021-03-22 DIAGNOSIS — Z90.710 ACQUIRED ABSENCE OF BOTH CERVIX AND UTERUS: Chronic | ICD-10-CM

## 2021-03-22 DIAGNOSIS — Z98.89 OTHER SPECIFIED POSTPROCEDURAL STATES: Chronic | ICD-10-CM

## 2021-03-22 DIAGNOSIS — Z98.41 CATARACT EXTRACTION STATUS, RIGHT EYE: Chronic | ICD-10-CM

## 2021-03-22 DIAGNOSIS — Z41.9 ENCOUNTER FOR PROCEDURE FOR PURPOSES OTHER THAN REMEDYING HEALTH STATE, UNSPECIFIED: Chronic | ICD-10-CM

## 2021-03-22 DIAGNOSIS — Z87.898 PERSONAL HISTORY OF OTHER SPECIFIED CONDITIONS: ICD-10-CM

## 2021-03-22 DIAGNOSIS — K66.0 PERITONEAL ADHESIONS (POSTPROCEDURAL) (POSTINFECTION): Chronic | ICD-10-CM

## 2021-03-22 DIAGNOSIS — R14.0 ABDOMINAL DISTENSION (GASEOUS): ICD-10-CM

## 2021-03-22 PROCEDURE — 71100 X-RAY EXAM RIBS UNI 2 VIEWS: CPT

## 2021-03-22 PROCEDURE — 99214 OFFICE O/P EST MOD 30 MIN: CPT

## 2021-03-22 PROCEDURE — 71100 X-RAY EXAM RIBS UNI 2 VIEWS: CPT | Mod: 26,RT

## 2021-03-22 NOTE — HISTORY OF PRESENT ILLNESS
[FreeTextEntry1] : Follow-up for full and colonoscopy [de-identified] : The patient is a 80-year-old female with history of minimal carotid artery disease, hearing loss, hypercholesterolemia, mild aortic regurgitation, mild mitral regurgitation, osteopenia, prediabetes and low vitamin D who presents for follow-up patient was walking with  who fell one week ago pulling her down she hit the ground against her right side ribs denies shortness of breath palpitations loss of consciousness but complains of right sided rib pain.

## 2021-03-22 NOTE — ASSESSMENT
[FreeTextEntry1] : Patient is a 80-year-old female with history of mild carotid artery disease, borderline hypercholesterolemia, mild aortic regurgitation, mild mitral regurgitation osteopenia, prediabetes who presents for follow-up of a full one week ago with right rib pain\par \par 1. falls new mechanical full due to \par no loss of consciousness observe\par \par 2 right rib pain\par trouble soft tissue injury\par will check x-ray rule out fracture\par \par 3. Mild aortic regurgitation/mitral regurgitation\par asymptomatic follow-up with cardiology\par \par 4. Osteopenia\par calcium vitamin D\par \par 5 hypercholesterolemia\par given the fact no critical plaque buildup in carotid or coronaries\par will observe for now\par follow-up in three months\par \par 6. Carotid artery disease\par minimally no therapy at present time.\par Patient received covert vaccine

## 2021-03-22 NOTE — PHYSICAL EXAM
[Normal Sclera/Conjunctiva] : normal sclera/conjunctiva [Normal Outer Ear/Nose] : the outer ears and nose were normal in appearance [Normal] : normal gait, coordination grossly intact, no focal deficits and deep tendon reflexes were 2+ and symmetric [de-identified] : Right tenderness on lower rib and upper rib [de-identified] : No ecchymosis [de-identified] : No ecchymosis

## 2021-03-22 NOTE — REVIEW OF SYSTEMS
[Muscle Pain] : muscle pain [Negative] : Respiratory [Joint Pain] : no joint pain [Joint Stiffness] : no joint stiffness [Joint Swelling] : no joint swelling [Back Pain] : no back pain [FreeTextEntry9] : Right sided rib pain

## 2021-03-24 ENCOUNTER — LABORATORY RESULT (OUTPATIENT)
Age: 81
End: 2021-03-24

## 2021-03-24 ENCOUNTER — APPOINTMENT (OUTPATIENT)
Dept: CARDIOLOGY | Facility: CLINIC | Age: 81
End: 2021-03-24
Payer: MEDICARE

## 2021-03-24 VITALS
WEIGHT: 120 LBS | SYSTOLIC BLOOD PRESSURE: 120 MMHG | DIASTOLIC BLOOD PRESSURE: 70 MMHG | HEIGHT: 66 IN | OXYGEN SATURATION: 94 % | TEMPERATURE: 97.8 F | BODY MASS INDEX: 19.29 KG/M2 | RESPIRATION RATE: 15 BRPM

## 2021-03-24 PROCEDURE — 99213 OFFICE O/P EST LOW 20 MIN: CPT

## 2021-03-26 ENCOUNTER — NON-APPOINTMENT (OUTPATIENT)
Age: 81
End: 2021-03-26

## 2021-03-30 ENCOUNTER — TRANSCRIPTION ENCOUNTER (OUTPATIENT)
Age: 81
End: 2021-03-30

## 2021-04-06 ENCOUNTER — APPOINTMENT (OUTPATIENT)
Dept: CARDIOLOGY | Facility: CLINIC | Age: 81
End: 2021-04-06
Payer: SELF-PAY

## 2021-04-06 PROCEDURE — 97802 MEDICAL NUTRITION INDIV IN: CPT

## 2021-05-04 ENCOUNTER — LABORATORY RESULT (OUTPATIENT)
Age: 81
End: 2021-05-04

## 2021-05-13 ENCOUNTER — NON-APPOINTMENT (OUTPATIENT)
Age: 81
End: 2021-05-13

## 2021-06-23 ENCOUNTER — APPOINTMENT (OUTPATIENT)
Dept: CARDIOLOGY | Facility: CLINIC | Age: 81
End: 2021-06-23
Payer: MEDICARE

## 2021-06-23 ENCOUNTER — NON-APPOINTMENT (OUTPATIENT)
Age: 81
End: 2021-06-23

## 2021-06-23 VITALS
BODY MASS INDEX: 18.8 KG/M2 | DIASTOLIC BLOOD PRESSURE: 82 MMHG | TEMPERATURE: 97 F | WEIGHT: 117 LBS | HEART RATE: 79 BPM | SYSTOLIC BLOOD PRESSURE: 131 MMHG | OXYGEN SATURATION: 98 % | HEIGHT: 66 IN

## 2021-06-23 PROCEDURE — 93000 ELECTROCARDIOGRAM COMPLETE: CPT

## 2021-06-23 PROCEDURE — 99214 OFFICE O/P EST MOD 30 MIN: CPT

## 2021-06-23 NOTE — DISCUSSION/SUMMARY
[FreeTextEntry1] : This is a 80-year-old female past medical history is that of a murmur, hyperlipidemia, minimal coronary disease, who comes in for followup cardiac evaluation. The patient denies chest pain, shortness of breath, dizziness or syncope.\par Electrocardiogram done June 23, 2021 demonstrate normal sinus rhythm rate 64 bpm and is otherwise remarkable for poor R wave progression.\par Patient is still worried about atherosclerosis given the findings of aortic atherosclerosis.  She had a carotid Doppler examination done November 23, 2020 which demonstrated minimal bilateral carotid artery disease.  Echo Doppler examination done November 23, 2020 demonstrated borderline posterior mitral valve leaflet prolapse with mild mitral valve regurgitation, mild aortic valve regurgitation, mild tricuspid valve regurgitation, thinning and dyskinesis of the interatrial septum and normal left ventricular function with ejection fraction 66%.\par The patient will start on pravastatin 20 mg/day in addition to Zetia 10 mg/day for primary prevention\par The patient will have follow-up blood work in 6 weeks.  She will continue on her current diet and exercise program.\par The patient had a normal nuclear stress test November 19, 2020 with an estimated ejection fraction of 70%\par  2012 CAT scan report which referred to aortic atherosclerosis.  She also has carotid artery thickening.  She felt that she developed forgetfulness on Crestor therapy.\par Normal nuclear stress test done November 19, 2020.  She was able to exercise for 9 minutes without ischemic changes.\par \par The patient understands that aerobic exercises must be increased to 40 minutes 4 times per week. A detailed discussion of lifestyle modification was done today. The patient has a good understanding of the diagnosis, and treatment plan. Lifestyle modification was also outlined.

## 2021-06-23 NOTE — REASON FOR VISIT
[CV Risk Factors and Non-Cardiac Disease] : CV risk factors and non-cardiac disease [Coronary Artery Disease] : coronary artery disease [Follow-Up - Clinic] : a clinic follow-up of [Hyperlipidemia] : hyperlipidemia [Mitral Regurgitation] : mitral regurgitation [FreeTextEntry1] : murmur, carotid artery plaque, atherosclerosis of the aorta

## 2021-06-28 ENCOUNTER — APPOINTMENT (OUTPATIENT)
Dept: INTERNAL MEDICINE | Facility: CLINIC | Age: 81
End: 2021-06-28
Payer: MEDICARE

## 2021-06-28 ENCOUNTER — NON-APPOINTMENT (OUTPATIENT)
Age: 81
End: 2021-06-28

## 2021-06-28 VITALS
BODY MASS INDEX: 19.29 KG/M2 | WEIGHT: 120 LBS | HEART RATE: 61 BPM | DIASTOLIC BLOOD PRESSURE: 73 MMHG | TEMPERATURE: 97 F | HEIGHT: 66 IN | SYSTOLIC BLOOD PRESSURE: 135 MMHG | OXYGEN SATURATION: 100 %

## 2021-06-28 DIAGNOSIS — H40.9 UNSPECIFIED GLAUCOMA: ICD-10-CM

## 2021-06-28 PROCEDURE — G0439: CPT

## 2021-06-28 PROCEDURE — 99213 OFFICE O/P EST LOW 20 MIN: CPT | Mod: 25

## 2021-06-28 PROCEDURE — 36415 COLL VENOUS BLD VENIPUNCTURE: CPT

## 2021-06-28 PROCEDURE — 93000 ELECTROCARDIOGRAM COMPLETE: CPT

## 2021-06-28 NOTE — HISTORY OF PRESENT ILLNESS
[FreeTextEntry1] : Comprehensive annual physical examination follow-up for hyperlipidemia carotid artery disease Gerd [de-identified] : The patient is a healthy 80-year-old female with history of minimal carotid artery disease, aortic calcification, hearing loss, dense breasts, hypercholesterolemia, vitamin D deficiency mild aortic regurgitation mild mitral regurgitation prediabetes who presents for comprehensive annual physical examination. Patient feels well complains of occasional nausea denies chest pain, shortness of breath distant exertion palpitation lightheadedness dizziness feels well

## 2021-06-28 NOTE — REVIEW OF SYSTEMS
[Nausea] : nausea [Negative] : Heme/Lymph [Abdominal Pain] : no abdominal pain [Constipation] : no constipation [Diarrhea] : diarrhea [Vomiting] : no vomiting [Heartburn] : no heartburn [Melena] : no melena

## 2021-06-28 NOTE — ASSESSMENT
[FreeTextEntry1] : The patient is a 80-year-old female with history of hypercholesterolemia, prediabetes, mild aortic sclerosis, mild carotid artery disease, mild noncritical coronary disease Gerd, dense breasts, low vitamin D, mild mitral and or aortic regurgitation, hearing loss and osteopenia who presents for comprehensive annual physical examination complains of mild nausea\par \par 1 nausea/Gerd\par doing fairly well off medication\par split cholesterol medication decide which possibly causing nausea\par observe\par \par 2. Mild carotid artery disease/noncritical coronary disease\par continue aggressive cholesterol-lowering LDL less than 70 if possible\par \par 3. Hyperlipidemia\par continue Zetia and pravastatin 20 mg once a day\par check lipid profile\par \par 4. Prediabetes\par check hemoglobin A1c\par \par 5 osteopenia\par continue vitamin D supplement exercise\par check vitamin D level bone density in the fall\par \par 6. Vitamin D deficiency\par check level continue vitamin D supplement\par \par 7. Mild aortic and mitral regurgitation\par observe follow-up with cardiology\par \par 8 hearing loss\par doing well\par \par 10 anxiety\par stable off medication\par \par 11 health maintenance\par receive covert vaccine\par check spike protein level\par check routine labs\par follow-up in 3 months\par history of dense breasts fibrocystic changes mammogram in December.\par Virtual colonoscopy done last year.

## 2021-06-28 NOTE — REASON FOR VISIT
[Annual Wellness Visit] : an annual wellness visit [FreeTextEntry1] : And follow-up for hyperlipidemia osteopenia Gerd. Complex Repair Preamble Text (Leave Blank If You Do Not Want): Extensive wide undermining was performed.

## 2021-06-28 NOTE — HEALTH RISK ASSESSMENT
[Very Good] : ~his/her~  mood as very good [Yes] : Yes [2 - 3 times a week (3 pts)] : 2 - 3  times a week (3 points) [1 or 2 (0 pts)] : 1 or 2 (0 points) [Never (0 pts)] : Never (0 points) [No falls in past year] : Patient reported no falls in the past year [0] : 2) Feeling down, depressed, or hopeless: Not at all (0) [Patient reported mammogram was normal] : Patient reported mammogram was normal [Patient reported bone density results were normal] : Patient reported bone density results were normal [Patient reported colonoscopy was normal] : Patient reported colonoscopy was normal [HIV test declined] : HIV test declined [Hepatitis C test declined] : Hepatitis C test declined [None] : None [With Family] : lives with family [# of Members in Household ___] :  household currently consist of [unfilled] member(s) [Retired] : retired [College] : College [] :  [# Of Children ___] : has [unfilled] children [Feels Safe at Home] : Feels safe at home [Fully functional (bathing, dressing, toileting, transferring, walking, feeding)] : Fully functional (bathing, dressing, toileting, transferring, walking, feeding) [Fully functional (using the telephone, shopping, preparing meals, housekeeping, doing laundry, using] : Fully functional and needs no help or supervision to perform IADLs (using the telephone, shopping, preparing meals, housekeeping, doing laundry, using transportation, managing medications and managing finances) [Reports normal functional visual acuity (ie: able to read med bottle)] : Reports normal functional visual acuity [Smoke Detector] : smoke detector [Carbon Monoxide Detector] : carbon monoxide detector [Safety elements used in home] : safety elements used in home [Seat Belt] :  uses seat belt [Sunscreen] : uses sunscreen [Caregiver Concerns] : has caregiver concerns [] : No [Audit-CScore] : 3 [de-identified] : Walks guardians [[id number]] : 0 [Change in mental status noted] : No change in mental status noted [Language] : denies difficulty with language [Behavior] : denies difficulty with behavior [Learning/Retaining New Information] : denies difficulty learning/retaining new information [Handling Complex Tasks] : denies difficulty handling complex tasks [Reasoning] : denies difficulty with reasoning [Spatial Ability and Orientation] : denies difficulty with spatial ability and orientation [Sexually Active] : not sexually active [High Risk Behavior] : no high risk behavior [Reports changes in hearing] : Reports no changes in hearing [Reports changes in vision] : Reports no changes in vision [Reports changes in dental health] : Reports no changes in dental health [Guns at Home] : no guns at home [Travel to Developing Areas] : does not  travel to developing areas [TB Exposure] : is not being exposed to tuberculosis [MammogramDate] : December 2020 [BoneDensityDate] : 2019 [ColonoscopyDate] : 2021 March

## 2021-06-28 NOTE — PHYSICAL EXAM
[Normal Sclera/Conjunctiva] : normal sclera/conjunctiva [Normal Outer Ear/Nose] : the outer ears and nose were normal in appearance [Normal Appearance] : normal in appearance [No Masses] : no palpable masses [No Nipple Discharge] : no nipple discharge [No Axillary Lymphadenopathy] : no axillary lymphadenopathy [Normal] : affect was normal and insight and judgment were intact

## 2021-06-29 LAB
25(OH)D3 SERPL-MCNC: 45.6 NG/ML
ALBUMIN SERPL ELPH-MCNC: 4.5 G/DL
ALP BLD-CCNC: 83 U/L
ALT SERPL-CCNC: 23 U/L
ANION GAP SERPL CALC-SCNC: 12 MMOL/L
AST SERPL-CCNC: 34 U/L
BASOPHILS # BLD AUTO: 0.05 K/UL
BASOPHILS NFR BLD AUTO: 0.8 %
BILIRUB SERPL-MCNC: 0.4 MG/DL
BUN SERPL-MCNC: 14 MG/DL
CALCIUM SERPL-MCNC: 9.7 MG/DL
CHLORIDE SERPL-SCNC: 103 MMOL/L
CHOLEST SERPL-MCNC: 183 MG/DL
CO2 SERPL-SCNC: 25 MMOL/L
COVID-19 SPIKE DOMAIN ANTIBODY INTERPRETATION: POSITIVE
CREAT SERPL-MCNC: 0.55 MG/DL
EOSINOPHIL # BLD AUTO: 0.25 K/UL
EOSINOPHIL NFR BLD AUTO: 4 %
ESTIMATED AVERAGE GLUCOSE: 114 MG/DL
GLUCOSE SERPL-MCNC: 86 MG/DL
HBA1C MFR BLD HPLC: 5.6 %
HCT VFR BLD CALC: 43.8 %
HDLC SERPL-MCNC: 84 MG/DL
HGB BLD-MCNC: 13.1 G/DL
IMM GRANULOCYTES NFR BLD AUTO: 0.2 %
LDLC SERPL CALC-MCNC: 85 MG/DL
LYMPHOCYTES # BLD AUTO: 1.53 K/UL
LYMPHOCYTES NFR BLD AUTO: 24.2 %
MAN DIFF?: NORMAL
MCHC RBC-ENTMCNC: 27.6 PG
MCHC RBC-ENTMCNC: 29.9 GM/DL
MCV RBC AUTO: 92.4 FL
MONOCYTES # BLD AUTO: 0.6 K/UL
MONOCYTES NFR BLD AUTO: 9.5 %
NEUTROPHILS # BLD AUTO: 3.87 K/UL
NEUTROPHILS NFR BLD AUTO: 61.3 %
NONHDLC SERPL-MCNC: 99 MG/DL
PLATELET # BLD AUTO: 211 K/UL
POTASSIUM SERPL-SCNC: 4.5 MMOL/L
PROT SERPL-MCNC: 6.2 G/DL
RBC # BLD: 4.74 M/UL
RBC # FLD: 14.9 %
SARS-COV-2 AB SERPL IA-ACNC: >250 U/ML
SODIUM SERPL-SCNC: 141 MMOL/L
TRIGL SERPL-MCNC: 69 MG/DL
TSH SERPL-ACNC: 0.83 UIU/ML
VIT B12 SERPL-MCNC: 1596 PG/ML
WBC # FLD AUTO: 6.31 K/UL

## 2021-10-05 ENCOUNTER — MED ADMIN CHARGE (OUTPATIENT)
Age: 81
End: 2021-10-05

## 2021-10-15 ENCOUNTER — LABORATORY RESULT (OUTPATIENT)
Age: 81
End: 2021-10-15

## 2021-10-18 ENCOUNTER — APPOINTMENT (OUTPATIENT)
Dept: CARDIOLOGY | Facility: CLINIC | Age: 81
End: 2021-10-18
Payer: MEDICARE

## 2021-10-18 ENCOUNTER — NON-APPOINTMENT (OUTPATIENT)
Age: 81
End: 2021-10-18

## 2021-10-18 VITALS
BODY MASS INDEX: 19.13 KG/M2 | HEART RATE: 80 BPM | DIASTOLIC BLOOD PRESSURE: 81 MMHG | RESPIRATION RATE: 16 BRPM | WEIGHT: 119 LBS | OXYGEN SATURATION: 98 % | SYSTOLIC BLOOD PRESSURE: 124 MMHG | HEIGHT: 66 IN | TEMPERATURE: 97.3 F

## 2021-10-18 PROCEDURE — 93000 ELECTROCARDIOGRAM COMPLETE: CPT

## 2021-10-18 PROCEDURE — 99214 OFFICE O/P EST MOD 30 MIN: CPT

## 2021-10-18 RX ORDER — CHROMIUM 200 MCG
25 MCG TABLET ORAL DAILY
Refills: 0 | Status: COMPLETED | COMMUNITY
Start: 2020-12-28 | End: 2021-10-18

## 2021-10-18 RX ORDER — FLUTICASONE FUROATE 27.5 UG/1
27.5 SPRAY, METERED NASAL DAILY
Qty: 1 | Refills: 0 | Status: COMPLETED | COMMUNITY
Start: 2021-10-05 | End: 2021-10-18

## 2021-10-18 NOTE — REVIEW OF SYSTEMS
[Negative] : Heme/Lymph [SOB] : no shortness of breath [Dyspnea on exertion] : not dyspnea during exertion [Leg Claudication] : no intermittent leg claudication [Palpitations] : no palpitations [Syncope] : no syncope [FreeTextEntry5] : epigastric pain

## 2021-10-18 NOTE — DISCUSSION/SUMMARY
[FreeTextEntry1] : This is a 81-year-old female past medical history is that of a murmur, hyperlipidemia, minimal coronary disease, who comes in for followup cardiac evaluation. The patient denies chest pain, shortness of breath, dizziness or syncope.\par The patient does get occasional chest discomfort unrelated to any particular activity. She remains worried about coronary artery disease.\par I have recommended she schedule a coronary CTA to rule out significant coronary artery disease. She will follow up with me after this examination is completed.\par Her symptoms still may represent GERD, but given her risk profile, and concerns, I would proceed with a coronary CTA.\par Electrocardiogram done June 23, 2021 demonstrate normal sinus rhythm rate 64 bpm and is otherwise remarkable for poor R wave progression.\par Patient is still worried about atherosclerosis given the findings of aortic atherosclerosis.  She had a carotid Doppler examination done November 23, 2020 which demonstrated minimal bilateral carotid artery disease.  Echo Doppler examination done November 23, 2020 demonstrated borderline posterior mitral valve leaflet prolapse with mild mitral valve regurgitation, mild aortic valve regurgitation, mild tricuspid valve regurgitation, thinning and dyskinesis of the interatrial septum and normal left ventricular function with ejection fraction 66%.\par The patient had a normal nuclear stress test November 19, 2020 with an estimated ejection fraction of 70%\par  2012 CAT scan report which referred to aortic atherosclerosis.  She also has carotid artery thickening.  She felt that she developed forgetfulness on Crestor therapy.\par \par The patient understands that aerobic exercises must be increased to 40 minutes 4 times per week. A detailed discussion of lifestyle modification was done today. The patient has a good understanding of the diagnosis, and treatment plan. Lifestyle modification was also outlined.

## 2021-10-18 NOTE — PHYSICAL EXAM
[Well Developed] : well developed [Well Nourished] : well nourished [No Acute Distress] : no acute distress [Normal Venous Pressure] : normal venous pressure [No Carotid Bruit] : no carotid bruit [Normal S1, S2] : normal S1, S2 [No Murmur] : no murmur [No Rub] : no rub [Clear Lung Fields] : clear lung fields [Good Air Entry] : good air entry [No Respiratory Distress] : no respiratory distress  [Soft] : abdomen soft [Non Tender] : non-tender [No Masses/organomegaly] : no masses/organomegaly [Normal Bowel Sounds] : normal bowel sounds [Normal Gait] : normal gait [No Edema] : no edema [No Cyanosis] : no cyanosis [No Clubbing] : no clubbing [No Varicosities] : no varicosities [No Rash] : no rash [No Skin Lesions] : no skin lesions [Moves all extremities] : moves all extremities [No Focal Deficits] : no focal deficits [Normal Speech] : normal speech [Alert and Oriented] : alert and oriented [Normal memory] : normal memory [General Appearance - Well Developed] : well developed [Normal Appearance] : normal appearance [Well Groomed] : well groomed [General Appearance - Well Nourished] : well nourished [No Deformities] : no deformities [General Appearance - In No Acute Distress] : no acute distress [Normal Conjunctiva] : the conjunctiva exhibited no abnormalities [Normal Oral Mucosa] : normal oral mucosa [Normal Oropharynx] : normal oropharynx [Normal Jugular Venous A Waves Present] : normal jugular venous A waves present [Normal Jugular Venous V Waves Present] : normal jugular venous V waves present [No Jugular Venous Feldman A Waves] : no jugular venous feldman A waves [Respiration, Rhythm And Depth] : normal respiratory rhythm and effort [Exaggerated Use Of Accessory Muscles For Inspiration] : no accessory muscle use [Auscultation Breath Sounds / Voice Sounds] : lungs were clear to auscultation bilaterally [Bowel Sounds] : normal bowel sounds [Abdomen Soft] : soft [Abnormal Walk] : normal gait [Nail Clubbing] : no clubbing of the fingernails [Cyanosis, Localized] : no localized cyanosis [Skin Color & Pigmentation] : normal skin color and pigmentation [Skin Turgor] : normal skin turgor [] : no rash [Oriented To Time, Place, And Person] : oriented to person, place, and time [Impaired Insight] : insight and judgment were intact [No Anxiety] : not feeling anxious [5th Left ICS - MCL] : palpated at the 5th LICS in the midclavicular line [Normal] : normal [No Precordial Heave] : no precordial heave was noted [Normal Rate] : normal [Rhythm Regular] : regular [Normal S1] : normal S1 [Normal S2] : normal S2 [No Gallop] : no gallop heard [II] : a grade 2 [I] : a grade 1 [2+] : left 2+ [No Abnormalities] : the abdominal aorta was not enlarged and no bruit was heard [No Pitting Edema] : no pitting edema present [Apical Thrill] : no thrill palpable at the apex [S3] : no S3 [S4] : no S4 [Click] : no click [Pericardial Rub] : no pericardial rub [Right Carotid Bruit] : no bruit heard over the right carotid [Left Carotid Bruit] : no bruit heard over the left carotid [Right Femoral Bruit] : no bruit heard over the right femoral artery [Left Femoral Bruit] : no bruit heard over the left femoral artery

## 2021-10-27 DIAGNOSIS — J01.90 ACUTE SINUSITIS, UNSPECIFIED: ICD-10-CM

## 2021-11-04 ENCOUNTER — OUTPATIENT (OUTPATIENT)
Dept: OUTPATIENT SERVICES | Facility: HOSPITAL | Age: 81
LOS: 1 days | End: 2021-11-04
Payer: MEDICARE

## 2021-11-04 ENCOUNTER — APPOINTMENT (OUTPATIENT)
Dept: CT IMAGING | Facility: CLINIC | Age: 81
End: 2021-11-04
Payer: MEDICARE

## 2021-11-04 DIAGNOSIS — Z98.89 OTHER SPECIFIED POSTPROCEDURAL STATES: Chronic | ICD-10-CM

## 2021-11-04 DIAGNOSIS — Z41.9 ENCOUNTER FOR PROCEDURE FOR PURPOSES OTHER THAN REMEDYING HEALTH STATE, UNSPECIFIED: Chronic | ICD-10-CM

## 2021-11-04 DIAGNOSIS — K66.0 PERITONEAL ADHESIONS (POSTPROCEDURAL) (POSTINFECTION): Chronic | ICD-10-CM

## 2021-11-04 DIAGNOSIS — Z98.41 CATARACT EXTRACTION STATUS, RIGHT EYE: Chronic | ICD-10-CM

## 2021-11-04 DIAGNOSIS — R07.9 CHEST PAIN, UNSPECIFIED: ICD-10-CM

## 2021-11-04 DIAGNOSIS — Z90.710 ACQUIRED ABSENCE OF BOTH CERVIX AND UTERUS: Chronic | ICD-10-CM

## 2021-11-04 DIAGNOSIS — I70.0 ATHEROSCLEROSIS OF AORTA: ICD-10-CM

## 2021-11-04 PROCEDURE — 82565 ASSAY OF CREATININE: CPT

## 2021-11-04 PROCEDURE — 75574 CT ANGIO HRT W/3D IMAGE: CPT | Mod: 26,MF

## 2021-11-04 PROCEDURE — G1004: CPT

## 2021-11-04 PROCEDURE — 75574 CT ANGIO HRT W/3D IMAGE: CPT | Mod: MF

## 2021-11-09 ENCOUNTER — RESULT REVIEW (OUTPATIENT)
Age: 81
End: 2021-11-09

## 2021-11-09 ENCOUNTER — OUTPATIENT (OUTPATIENT)
Dept: OUTPATIENT SERVICES | Facility: HOSPITAL | Age: 81
LOS: 1 days | End: 2021-11-09
Payer: MEDICARE

## 2021-11-09 DIAGNOSIS — Z90.710 ACQUIRED ABSENCE OF BOTH CERVIX AND UTERUS: Chronic | ICD-10-CM

## 2021-11-09 DIAGNOSIS — I70.0 ATHEROSCLEROSIS OF AORTA: ICD-10-CM

## 2021-11-09 DIAGNOSIS — R07.9 CHEST PAIN, UNSPECIFIED: ICD-10-CM

## 2021-11-09 DIAGNOSIS — Z98.89 OTHER SPECIFIED POSTPROCEDURAL STATES: Chronic | ICD-10-CM

## 2021-11-09 DIAGNOSIS — K66.0 PERITONEAL ADHESIONS (POSTPROCEDURAL) (POSTINFECTION): Chronic | ICD-10-CM

## 2021-11-09 DIAGNOSIS — Z98.41 CATARACT EXTRACTION STATUS, RIGHT EYE: Chronic | ICD-10-CM

## 2021-11-09 DIAGNOSIS — Z41.9 ENCOUNTER FOR PROCEDURE FOR PURPOSES OTHER THAN REMEDYING HEALTH STATE, UNSPECIFIED: Chronic | ICD-10-CM

## 2021-11-09 PROCEDURE — 0502T: CPT

## 2021-11-09 PROCEDURE — 0504T: CPT

## 2021-11-09 PROCEDURE — 0503T: CPT

## 2021-11-23 ENCOUNTER — APPOINTMENT (OUTPATIENT)
Dept: DERMATOLOGY | Facility: CLINIC | Age: 81
End: 2021-11-23

## 2021-12-06 ENCOUNTER — NON-APPOINTMENT (OUTPATIENT)
Age: 81
End: 2021-12-06

## 2021-12-13 LAB — SARS-COV-2 N GENE NPH QL NAA+PROBE: NOT DETECTED

## 2021-12-14 ENCOUNTER — OUTPATIENT (OUTPATIENT)
Dept: OUTPATIENT SERVICES | Facility: HOSPITAL | Age: 81
LOS: 1 days | End: 2021-12-14
Payer: MEDICARE

## 2021-12-14 VITALS
DIASTOLIC BLOOD PRESSURE: 68 MMHG | OXYGEN SATURATION: 99 % | SYSTOLIC BLOOD PRESSURE: 138 MMHG | RESPIRATION RATE: 14 BRPM | HEART RATE: 87 BPM

## 2021-12-14 VITALS — HEIGHT: 66 IN | WEIGHT: 119.05 LBS

## 2021-12-14 DIAGNOSIS — Z98.89 OTHER SPECIFIED POSTPROCEDURAL STATES: Chronic | ICD-10-CM

## 2021-12-14 DIAGNOSIS — Z98.41 CATARACT EXTRACTION STATUS, RIGHT EYE: Chronic | ICD-10-CM

## 2021-12-14 DIAGNOSIS — Z41.9 ENCOUNTER FOR PROCEDURE FOR PURPOSES OTHER THAN REMEDYING HEALTH STATE, UNSPECIFIED: Chronic | ICD-10-CM

## 2021-12-14 DIAGNOSIS — K66.0 PERITONEAL ADHESIONS (POSTPROCEDURAL) (POSTINFECTION): Chronic | ICD-10-CM

## 2021-12-14 DIAGNOSIS — R94.8 ABNORMAL RESULTS OF FUNCTION STUDIES OF OTHER ORGANS AND SYSTEMS: ICD-10-CM

## 2021-12-14 DIAGNOSIS — Z90.710 ACQUIRED ABSENCE OF BOTH CERVIX AND UTERUS: Chronic | ICD-10-CM

## 2021-12-14 LAB
ANION GAP SERPL CALC-SCNC: 12 MMOL/L — SIGNIFICANT CHANGE UP (ref 5–17)
BUN SERPL-MCNC: 17 MG/DL — SIGNIFICANT CHANGE UP (ref 7–23)
CALCIUM SERPL-MCNC: 10.2 MG/DL — SIGNIFICANT CHANGE UP (ref 8.4–10.5)
CHLORIDE SERPL-SCNC: 102 MMOL/L — SIGNIFICANT CHANGE UP (ref 96–108)
CO2 SERPL-SCNC: 24 MMOL/L — SIGNIFICANT CHANGE UP (ref 22–31)
CREAT SERPL-MCNC: 0.5 MG/DL — SIGNIFICANT CHANGE UP (ref 0.5–1.3)
GLUCOSE SERPL-MCNC: 106 MG/DL — HIGH (ref 70–99)
HCT VFR BLD CALC: 43.2 % — SIGNIFICANT CHANGE UP (ref 34.5–45)
HGB BLD-MCNC: 13.5 G/DL — SIGNIFICANT CHANGE UP (ref 11.5–15.5)
MCHC RBC-ENTMCNC: 27.5 PG — SIGNIFICANT CHANGE UP (ref 27–34)
MCHC RBC-ENTMCNC: 31.3 GM/DL — LOW (ref 32–36)
MCV RBC AUTO: 88 FL — SIGNIFICANT CHANGE UP (ref 80–100)
NRBC # BLD: 0 /100 WBCS — SIGNIFICANT CHANGE UP (ref 0–0)
PLATELET # BLD AUTO: 197 K/UL — SIGNIFICANT CHANGE UP (ref 150–400)
POTASSIUM SERPL-MCNC: 5.1 MMOL/L — SIGNIFICANT CHANGE UP (ref 3.5–5.3)
POTASSIUM SERPL-SCNC: 5.1 MMOL/L — SIGNIFICANT CHANGE UP (ref 3.5–5.3)
RBC # BLD: 4.91 M/UL — SIGNIFICANT CHANGE UP (ref 3.8–5.2)
RBC # FLD: 14.2 % — SIGNIFICANT CHANGE UP (ref 10.3–14.5)
SODIUM SERPL-SCNC: 138 MMOL/L — SIGNIFICANT CHANGE UP (ref 135–145)
WBC # BLD: 7.56 K/UL — SIGNIFICANT CHANGE UP (ref 3.8–10.5)
WBC # FLD AUTO: 7.56 K/UL — SIGNIFICANT CHANGE UP (ref 3.8–10.5)

## 2021-12-14 PROCEDURE — 99152 MOD SED SAME PHYS/QHP 5/>YRS: CPT

## 2021-12-14 PROCEDURE — 93010 ELECTROCARDIOGRAM REPORT: CPT

## 2021-12-14 PROCEDURE — 93005 ELECTROCARDIOGRAM TRACING: CPT

## 2021-12-14 PROCEDURE — 93458 L HRT ARTERY/VENTRICLE ANGIO: CPT | Mod: 26

## 2021-12-14 PROCEDURE — 93458 L HRT ARTERY/VENTRICLE ANGIO: CPT

## 2021-12-14 PROCEDURE — 85027 COMPLETE CBC AUTOMATED: CPT

## 2021-12-14 PROCEDURE — 80048 BASIC METABOLIC PNL TOTAL CA: CPT

## 2021-12-14 PROCEDURE — C1887: CPT

## 2021-12-14 PROCEDURE — C1894: CPT

## 2021-12-14 PROCEDURE — C1769: CPT

## 2021-12-14 RX ORDER — OMEGA-3 ACID ETHYL ESTERS 1 G
3 CAPSULE ORAL
Qty: 0 | Refills: 0 | DISCHARGE

## 2021-12-14 RX ORDER — BRIMONIDINE TARTRATE, TIMOLOL MALEATE 2; 5 MG/ML; MG/ML
1 SOLUTION/ DROPS OPHTHALMIC
Qty: 0 | Refills: 0 | DISCHARGE

## 2021-12-14 RX ORDER — EZETIMIBE 10 MG/1
1 TABLET ORAL
Qty: 0 | Refills: 0 | DISCHARGE

## 2021-12-14 RX ORDER — ASPIRIN/CALCIUM CARB/MAGNESIUM 324 MG
1 TABLET ORAL
Qty: 0 | Refills: 0 | DISCHARGE

## 2021-12-14 RX ORDER — CHOLECALCIFEROL (VITAMIN D3) 125 MCG
1 CAPSULE ORAL
Qty: 0 | Refills: 0 | DISCHARGE

## 2021-12-14 RX ORDER — SODIUM CHLORIDE 9 MG/ML
3 INJECTION INTRAMUSCULAR; INTRAVENOUS; SUBCUTANEOUS EVERY 8 HOURS
Refills: 0 | Status: DISCONTINUED | OUTPATIENT
Start: 2021-12-14 | End: 2021-12-28

## 2021-12-14 RX ORDER — LATANOPROST 0.05 MG/ML
1 SOLUTION/ DROPS OPHTHALMIC; TOPICAL
Qty: 0 | Refills: 0 | DISCHARGE

## 2021-12-14 NOTE — ASU DISCHARGE PLAN (ADULT/PEDIATRIC) - CARE PROVIDER_API CALL
Sidney William (MD)  Cardiology; Cardiovascular Disease; Internal Medicine  [address] [facility] [address]  [place] [address]  Phone: [phone]  Fax: [phone]  Follow Up Time: 2 weeks

## 2021-12-14 NOTE — H&P CARDIOLOGY - NSICDXPASTSURGICALHX_GEN_ALL_CORE_FT
PAST SURGICAL HISTORY:  Abdominal adhesions repair    Cataract extraction status of eye, right     Elective surgery neck lift    H/O hysterectomy with oophorectomy     History of appendectomy     History of bladder surgery punctured bladder repair

## 2021-12-14 NOTE — H&P CARDIOLOGY - HISTORY OF PRESENT ILLNESS
80 y/o active female with FH of CAD (both parents with CABG in their 70s), daily alcohol use (1/2 glass wine daily) last drink 12/13/21 and pmh of HLD, Mitral Regurgitation and Murmur, Carotid Artery Plaque, atherosclerosis of the aorta followed by PCP Dr. Ni and Dr. Sidney William, Cardiologist.  She states having mild epigastric discomfort for past 5 months with no regard to activity.  She states yesterday it happened several times at rest with belching and burping that relieved it.  She has not taken any medication but does recall on occasion the symptoms increased when gardening.  She is s/p TTE on 11/23/20 with LVEF 65% with mild MR and Mild AR and TR with thinning and dyskinesis of the inter-atrial septum.  Her CCTA with FFR on 11/9/2021 revealed first diagonal branch with abnormality.  She presents for [facility] for further evaluation and denies cp, sob or palpitations.  She is s/p her Pfizer Vaccine and Booster with her COVID PCR negative on 12/11/21 and denies sick contacts, fevers or chills with no implantable devices.

## 2021-12-14 NOTE — H&P CARDIOLOGY - NSICDXFAMILYHX_GEN_ALL_CORE_FT
FAMILY HISTORY:  Father  Still living? Unknown  FH: CABG (coronary artery bypass surgery), Age at diagnosis: Age Unknown    Mother  Still living? No  FH: CABG (coronary artery bypass surgery), Age at diagnosis: Age Unknown

## 2021-12-14 NOTE — H&P CARDIOLOGY - RESPIRATORY AND THORAX
Medical Necessity Clause: This procedure was medically necessary because the lesions that were treated were: Number Of Freeze-Thaw Cycles: 2 freeze-thaw cycles Show Topical Anesthesia Variable?: Yes Duration Of Freeze Thaw-Cycle (Seconds): 0 Render Post-Care Instructions In Note?: no Detail Level: Simple Consent: The patient's consent was obtained including but not limited to risks of crusting, scabbing, blistering, scarring, darker or lighter pigmentary change, recurrence, incomplete removal and infection. Post-Care Instructions: I reviewed with the patient in detail post-care instructions. Patient is to wear sunprotection, and avoid picking at any of the treated lesions. Pt may apply Vaseline to crusted or scabbing areas. Medical Necessity Information: It is in your best interest to select a reason for this procedure from the list below. All of these items fulfill various CMS LCD requirements except the new and changing color options. negative

## 2021-12-14 NOTE — ASU DISCHARGE PLAN (ADULT/PEDIATRIC) - NS MD DC FALL RISK RISK
For information on Fall & Injury Prevention, visit: [email or web address] OR  [email or web address] OR  https://www.cdc.gov/steadi/patient.html

## 2021-12-27 ENCOUNTER — APPOINTMENT (OUTPATIENT)
Dept: CARDIOLOGY | Facility: CLINIC | Age: 81
End: 2021-12-27
Payer: MEDICARE

## 2021-12-27 ENCOUNTER — NON-APPOINTMENT (OUTPATIENT)
Age: 81
End: 2021-12-27

## 2021-12-27 VITALS
TEMPERATURE: 97.6 F | HEART RATE: 67 BPM | HEIGHT: 66 IN | SYSTOLIC BLOOD PRESSURE: 120 MMHG | RESPIRATION RATE: 16 BRPM | BODY MASS INDEX: 18.8 KG/M2 | WEIGHT: 117 LBS | OXYGEN SATURATION: 98 % | DIASTOLIC BLOOD PRESSURE: 75 MMHG

## 2021-12-27 DIAGNOSIS — I73.9 PERIPHERAL VASCULAR DISEASE, UNSPECIFIED: ICD-10-CM

## 2021-12-27 PROCEDURE — 99214 OFFICE O/P EST MOD 30 MIN: CPT

## 2021-12-27 PROCEDURE — 93922 UPR/L XTREMITY ART 2 LEVELS: CPT

## 2021-12-27 PROCEDURE — 93000 ELECTROCARDIOGRAM COMPLETE: CPT

## 2021-12-27 NOTE — PHYSICAL EXAM
[Well Developed] : well developed [Well Nourished] : well nourished [No Acute Distress] : no acute distress [Normal Venous Pressure] : normal venous pressure [No Carotid Bruit] : no carotid bruit [Normal S1, S2] : normal S1, S2 [No Murmur] : no murmur [No Rub] : no rub [Clear Lung Fields] : clear lung fields [Good Air Entry] : good air entry [No Respiratory Distress] : no respiratory distress  [Soft] : abdomen soft [Non Tender] : non-tender [No Masses/organomegaly] : no masses/organomegaly [Normal Bowel Sounds] : normal bowel sounds [Normal Gait] : normal gait [No Edema] : no edema [No Cyanosis] : no cyanosis [No Clubbing] : no clubbing [No Varicosities] : no varicosities [No Rash] : no rash [No Skin Lesions] : no skin lesions [Moves all extremities] : moves all extremities [No Focal Deficits] : no focal deficits [Normal Speech] : normal speech [Alert and Oriented] : alert and oriented [Normal memory] : normal memory [General Appearance - Well Developed] : well developed [Normal Appearance] : normal appearance [Well Groomed] : well groomed [General Appearance - Well Nourished] : well nourished [No Deformities] : no deformities [General Appearance - In No Acute Distress] : no acute distress [Normal Conjunctiva] : the conjunctiva exhibited no abnormalities [Normal Oral Mucosa] : normal oral mucosa [Normal Oropharynx] : normal oropharynx [Normal Jugular Venous A Waves Present] : normal jugular venous A waves present [Normal Jugular Venous V Waves Present] : normal jugular venous V waves present [No Jugular Venous Feldman A Waves] : no jugular venous feldman A waves [Respiration, Rhythm And Depth] : normal respiratory rhythm and effort [Exaggerated Use Of Accessory Muscles For Inspiration] : no accessory muscle use [Auscultation Breath Sounds / Voice Sounds] : lungs were clear to auscultation bilaterally [Bowel Sounds] : normal bowel sounds [Abdomen Soft] : soft [Abnormal Walk] : normal gait [Nail Clubbing] : no clubbing of the fingernails [Cyanosis, Localized] : no localized cyanosis [Skin Color & Pigmentation] : normal skin color and pigmentation [Skin Turgor] : normal skin turgor [] : no rash [Oriented To Time, Place, And Person] : oriented to person, place, and time [Impaired Insight] : insight and judgment were intact [No Anxiety] : not feeling anxious [5th Left ICS - MCL] : palpated at the 5th LICS in the midclavicular line [Normal] : normal [No Precordial Heave] : no precordial heave was noted [Normal Rate] : normal [Rhythm Regular] : regular [Normal S1] : normal S1 [Normal S2] : normal S2 [No Gallop] : no gallop heard [II] : a grade 2 [I] : a grade 1 [2+] : left 2+ [No Abnormalities] : the abdominal aorta was not enlarged and no bruit was heard [No Pitting Edema] : no pitting edema present [Apical Thrill] : no thrill palpable at the apex [S3] : no S3 [S4] : no S4 [Click] : no click [Pericardial Rub] : no pericardial rub [Right Carotid Bruit] : no bruit heard over the right carotid [Left Carotid Bruit] : no bruit heard over the left carotid [Left Femoral Bruit] : no bruit heard over the left femoral artery [Right Femoral Bruit] : no bruit heard over the right femoral artery

## 2021-12-27 NOTE — DISCUSSION/SUMMARY
[FreeTextEntry1] : This is a 81-year-old female past medical history is that of a murmur, hyperlipidemia, minimal coronary disease, who comes in for followup cardiac evaluation. The patient denies chest pain, shortness of breath, dizziness or syncope.The patient was getting occasional chest discomfort unrelated to any particular activity. She remains worried about coronary artery disease.  She may also get occasional leg cramping when walking.  She will have an ankle-brachial index done to rule out significant peripheral arterial disease\par Electrocardiogram done December 27, 2021 demonstrated normal sinus rhythm rate 67 bpm is otherwise remarkable for left axis deviation, left atrial abnormality, poor R wave progression, and small R waves in leads V1 to V3.\par Ankle-brachial index done December 27, 2021 was within normal limits.\par Coronary CTA done November 4, 2021 demonstrated a coronary artery calcium score 79 units, including 42 units in left anterior descending artery, 37 units in the left main artery.  CTA analysis demonstrated minimal coronary artery disease in the left main artery of less than 25%, less than 25% disease in the mid left anterior descending artery, calcified plaque in the first diagonal branch consistent with mild stenosis of 30%, and evidence for moderate stenosis at least 50 to 55% further downstream in this first diagonal branch, no significant disease in the left circumflex branch, no significant disease in the right coronary artery.\par FFR analysis demonstrated possible significant disease in the first diagonal branch.\par Patient had a cardiac catheterization done on December 14, 2021 which demonstrated normal left main artery, mild atherosclerosis in the left anterior descending artery, with first diagonal branch being a very small vessel with an 80% stenosis, left circumflex artery had minor luminal irregularities, first obtuse marginal had no significant disease, right coronary artery had no significant disease.\par It was felt that the first diagonal branch was a small vessel and the risk of intervention was the risk of potentially causing acute closure of the artery and branch.  Medical therapy was recommended.\par \par She will start on Toprol-XL 25 mg after dinner for her coronary artery disease.\par New blood work will be done today for lipid panel on Zetia and Pravachol 20 mg daily.  She reports she could not tolerate Crestor or Lipitor secondary to memory changes as well as fatigue and muscle cramping.\par Her symptoms still may represent GERD, but given her risk profile, and concerns, I would proceed with a coronary CTA.\par Electrocardiogram done June 23, 2021 demonstrate normal sinus rhythm rate 64 bpm and is otherwise remarkable for poor R wave progression.\par  Echo Doppler examination done November 23, 2020 demonstrated borderline posterior mitral valve leaflet prolapse with mild mitral valve regurgitation, mild aortic valve regurgitation, mild tricuspid valve regurgitation, thinning and dyskinesis of the interatrial septum and normal left ventricular function with ejection fraction 66%.\par The patient had a normal nuclear stress test November 19, 2020 with an estimated ejection fraction of 70%\par  2012 CAT scan report which referred to aortic atherosclerosis.  She also has carotid artery thickening.  She felt that she developed forgetfulness on Crestor therapy.\par \par The patient understands that aerobic exercises must be increased to 40 minutes 4 times per week. A detailed discussion of lifestyle modification was done today. The patient has a good understanding of the diagnosis, and treatment plan. Lifestyle modification was also outlined.

## 2021-12-28 PROBLEM — I73.9 CLAUDICATION: Status: ACTIVE | Noted: 2021-12-27

## 2021-12-28 RX ORDER — AZITHROMYCIN 250 MG/1
250 TABLET, FILM COATED ORAL
Qty: 1 | Refills: 0 | Status: COMPLETED | COMMUNITY
Start: 2021-10-27 | End: 2021-12-28

## 2021-12-28 RX ORDER — TIMOLOL MALEATE 5 MG/ML
0.5 SOLUTION OPHTHALMIC
Qty: 1 | Refills: 3 | Status: COMPLETED | COMMUNITY
Start: 2021-06-28 | End: 2021-12-28

## 2021-12-28 RX ORDER — IPRATROPIUM BROMIDE 42 UG/1
0.06 SPRAY NASAL 4 TIMES DAILY
Qty: 1 | Refills: 1 | Status: COMPLETED | COMMUNITY
Start: 2021-10-27 | End: 2021-12-28

## 2022-02-15 ENCOUNTER — APPOINTMENT (OUTPATIENT)
Dept: CARDIOLOGY | Facility: CLINIC | Age: 82
End: 2022-02-15
Payer: MEDICARE

## 2022-02-15 VITALS
HEART RATE: 70 BPM | WEIGHT: 120 LBS | SYSTOLIC BLOOD PRESSURE: 118 MMHG | DIASTOLIC BLOOD PRESSURE: 70 MMHG | RESPIRATION RATE: 15 BRPM | BODY MASS INDEX: 19.29 KG/M2 | OXYGEN SATURATION: 95 % | HEIGHT: 66 IN | TEMPERATURE: 98 F

## 2022-02-15 DIAGNOSIS — I70.0 ATHEROSCLEROSIS OF AORTA: ICD-10-CM

## 2022-02-15 PROCEDURE — 99214 OFFICE O/P EST MOD 30 MIN: CPT

## 2022-02-15 NOTE — DISCUSSION/SUMMARY
[FreeTextEntry1] : This is a 81-year-old female past medical history is that of a murmur, hyperlipidemia, minimal coronary disease, who comes in for followup cardiac evaluation. The patient denies chest pain, shortness of breath, dizziness or syncope.\par The patient had a cardiac catheterization done December 14, 2021 which demonstrated normal left main artery, mild atherosclerosis of the proximal left anterior descending artery, there was a small first diagonal branch which had an 80% stenosis and was not amenable to intervention, the first obtuse marginal artery was normal in size and structure, the first left posterior lateral branch was normal in size and structure left posterior lateral descending artery was normal in size and structure and the circumflex artery had mild luminal irregularities.\par Medical therapy was recommended because the diagonal disease extended back into its ostium and a PCI could involve putting a portion of the stent in the proximal left anterior descending artery is shifting plaque into the left anterior descending artery, creating unwarranted risk.\par The patient is currently hemodynamically stable and asymptomatic from a cardiac standpoint.  The patient's LDL target should be less than 70 mg/dL.\par Electrocardiogram done December 27, 2021 demonstrated normal sinus rhythm rate 67 bpm is otherwise remarkable for left axis deviation, left atrial abnormality, poor R wave progression, and small R waves in leads V1 to V3.\par \par PMH:\par Coronary CTA done November 4, 2021 demonstrated a coronary artery calcium score 79 units, including 42 units in left anterior descending artery, 37 units in the left main artery.  CTA analysis demonstrated minimal coronary artery disease in the left main artery of less than 25%, less than 25% disease in the mid left anterior descending artery, calcified plaque in the first diagonal branch consistent with mild stenosis of 30%, and evidence for moderate stenosis at least 50 to 55% further downstream in this first diagonal branch, no significant disease in the left circumflex branch, no significant disease in the right coronary artery.\par FFR analysis demonstrated possible significant disease in the first diagonal branch.\par Patient had a cardiac catheterization done on December 14, 2021 which demonstrated normal left main artery, mild atherosclerosis in the left anterior descending artery, with first diagonal branch being a very small vessel with an 80% stenosis, left circumflex artery had minor luminal irregularities, first obtuse marginal had no significant disease, right coronary artery had no significant disease.\par It was felt that the first diagonal branch was a small vessel and the risk of intervention was the risk of potentially causing acute closure of the artery and branch.  Medical therapy was recommended.\par \par She will start on Toprol-XL 25 mg after dinner for her coronary artery disease.  She has been stable on Pravachol 20 mg daily and Zetia 10 mg/day.  She is unable to tolerate aspirin secondary to significant dyspepsia\par \par  Echo Doppler examination done November 23, 2020 demonstrated borderline posterior mitral valve leaflet prolapse with mild mitral valve regurgitation, mild aortic valve regurgitation, mild tricuspid valve regurgitation, thinning and dyskinesis of the interatrial septum and normal left ventricular function with ejection fraction 66%.\par The patient had a normal nuclear stress test November 19, 2020 with an estimated ejection fraction of 70%\par  2012 CAT scan report which referred to aortic atherosclerosis.  She also has carotid artery thickening.  She felt that she developed forgetfulness on Crestor therapy.\par \par The patient understands that aerobic exercises must be increased to 40 minutes 4 times per week. A detailed discussion of lifestyle modification was done today. The patient has a good understanding of the diagnosis, and treatment plan. Lifestyle modification was also outlined.

## 2022-03-01 ENCOUNTER — APPOINTMENT (OUTPATIENT)
Dept: DERMATOLOGY | Facility: CLINIC | Age: 82
End: 2022-03-01
Payer: MEDICARE

## 2022-03-01 VITALS — HEIGHT: 66.5 IN | WEIGHT: 120 LBS | BODY MASS INDEX: 19.06 KG/M2

## 2022-03-01 PROCEDURE — 17110 DESTRUCTION B9 LES UP TO 14: CPT | Mod: 59

## 2022-03-01 PROCEDURE — 17003 DESTRUCT PREMALG LES 2-14: CPT | Mod: 59

## 2022-03-01 PROCEDURE — 17000 DESTRUCT PREMALG LESION: CPT | Mod: 59

## 2022-03-01 PROCEDURE — 99214 OFFICE O/P EST MOD 30 MIN: CPT | Mod: 25

## 2022-03-17 ENCOUNTER — LABORATORY RESULT (OUTPATIENT)
Age: 82
End: 2022-03-17

## 2022-03-21 ENCOUNTER — RX RENEWAL (OUTPATIENT)
Age: 82
End: 2022-03-21

## 2022-03-21 ENCOUNTER — APPOINTMENT (OUTPATIENT)
Dept: CARDIOLOGY | Facility: CLINIC | Age: 82
End: 2022-03-21

## 2022-03-28 ENCOUNTER — RX RENEWAL (OUTPATIENT)
Age: 82
End: 2022-03-28

## 2022-04-11 PROBLEM — Z11.59 SCREENING FOR VIRAL DISEASE: Status: RESOLVED | Noted: 2020-09-18 | Resolved: 2020-11-16

## 2022-04-11 PROBLEM — Z11.59 SCREENING FOR VIRAL DISEASE: Status: RESOLVED | Noted: 2020-06-18 | Resolved: 2020-11-16

## 2022-04-20 LAB
COVID-19 SPIKE DOMAIN ANTIBODY INTERPRETATION: POSITIVE
SARS-COV-2 AB SERPL IA-ACNC: >250 U/ML

## 2022-06-15 ENCOUNTER — NON-APPOINTMENT (OUTPATIENT)
Age: 82
End: 2022-06-15

## 2022-06-15 ENCOUNTER — APPOINTMENT (OUTPATIENT)
Dept: INTERNAL MEDICINE | Facility: CLINIC | Age: 82
End: 2022-06-15
Payer: MEDICARE

## 2022-06-15 VITALS
SYSTOLIC BLOOD PRESSURE: 129 MMHG | OXYGEN SATURATION: 97 % | HEIGHT: 66 IN | WEIGHT: 121 LBS | HEART RATE: 73 BPM | DIASTOLIC BLOOD PRESSURE: 66 MMHG | TEMPERATURE: 97.7 F | BODY MASS INDEX: 19.44 KG/M2

## 2022-06-15 VITALS — DIASTOLIC BLOOD PRESSURE: 66 MMHG | SYSTOLIC BLOOD PRESSURE: 122 MMHG

## 2022-06-15 DIAGNOSIS — R07.81 PLEURODYNIA: ICD-10-CM

## 2022-06-15 DIAGNOSIS — M85.80 OTHER SPECIFIED DISORDERS OF BONE DENSITY AND STRUCTURE, UNSPECIFIED SITE: ICD-10-CM

## 2022-06-15 DIAGNOSIS — Z87.19 PERSONAL HISTORY OF OTHER DISEASES OF THE DIGESTIVE SYSTEM: ICD-10-CM

## 2022-06-15 DIAGNOSIS — Z92.29 PERSONAL HISTORY OF OTHER DRUG THERAPY: ICD-10-CM
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
## 2022-06-15 DIAGNOSIS — Z87.898 PERSONAL HISTORY OF OTHER SPECIFIED CONDITIONS: ICD-10-CM

## 2022-06-15 DIAGNOSIS — Z01.818 ENCOUNTER FOR OTHER PREPROCEDURAL EXAMINATION: ICD-10-CM

## 2022-06-15 DIAGNOSIS — Z00.00 ENCOUNTER FOR GENERAL ADULT MEDICAL EXAMINATION W/OUT ABNORMAL FINDINGS: ICD-10-CM

## 2022-06-15 DIAGNOSIS — Z86.79 PERSONAL HISTORY OF OTHER DISEASES OF THE CIRCULATORY SYSTEM: ICD-10-CM

## 2022-06-15 DIAGNOSIS — Z87.2 PERSONAL HISTORY OF DISEASES OF THE SKIN AND SUBCUTANEOUS TISSUE: ICD-10-CM

## 2022-06-15 LAB
25(OH)D3 SERPL-MCNC: 29.2 NG/ML
ALBUMIN SERPL ELPH-MCNC: 4.8 G/DL
ALP BLD-CCNC: 90 U/L
ALT SERPL-CCNC: 22 U/L
ANION GAP SERPL CALC-SCNC: 13 MMOL/L
AST SERPL-CCNC: 28 U/L
BASOPHILS # BLD AUTO: 0.04 K/UL
BASOPHILS NFR BLD AUTO: 0.5 %
BILIRUB SERPL-MCNC: 0.6 MG/DL
BUN SERPL-MCNC: 18 MG/DL
CALCIUM SERPL-MCNC: 9.8 MG/DL
CHLORIDE SERPL-SCNC: 102 MMOL/L
CHOLEST SERPL-MCNC: 180 MG/DL
CO2 SERPL-SCNC: 26 MMOL/L
CREAT SERPL-MCNC: 0.6 MG/DL
EGFR: 91 ML/MIN/1.73M2
EOSINOPHIL # BLD AUTO: 0.3 K/UL
EOSINOPHIL NFR BLD AUTO: 3.9 %
ESTIMATED AVERAGE GLUCOSE: 120 MG/DL
GLUCOSE SERPL-MCNC: 112 MG/DL
HBA1C MFR BLD HPLC: 5.8 %
HCT VFR BLD CALC: 42.3 %
HDLC SERPL-MCNC: 87 MG/DL
HGB BLD-MCNC: 13.2 G/DL
IMM GRANULOCYTES NFR BLD AUTO: 0.3 %
LDLC SERPL CALC-MCNC: 82 MG/DL
LYMPHOCYTES # BLD AUTO: 1.84 K/UL
LYMPHOCYTES NFR BLD AUTO: 24.1 %
MAN DIFF?: NORMAL
MCHC RBC-ENTMCNC: 27.5 PG
MCHC RBC-ENTMCNC: 31.2 GM/DL
MCV RBC AUTO: 88.1 FL
MONOCYTES # BLD AUTO: 0.62 K/UL
MONOCYTES NFR BLD AUTO: 8.1 %
NEUTROPHILS # BLD AUTO: 4.83 K/UL
NEUTROPHILS NFR BLD AUTO: 63.1 %
NONHDLC SERPL-MCNC: 93 MG/DL
PLATELET # BLD AUTO: 187 K/UL
POTASSIUM SERPL-SCNC: 4.8 MMOL/L
PROT SERPL-MCNC: 6.6 G/DL
RBC # BLD: 4.8 M/UL
RBC # FLD: 14.4 %
SODIUM SERPL-SCNC: 141 MMOL/L
TRIGL SERPL-MCNC: 55 MG/DL
TSH SERPL-ACNC: 1.22 UIU/ML
WBC # FLD AUTO: 7.65 K/UL

## 2022-06-15 PROCEDURE — G0439: CPT

## 2022-06-16 PROBLEM — R07.81 RIB PAIN ON RIGHT SIDE: Status: RESOLVED | Noted: 2021-03-22 | Resolved: 2022-06-16

## 2022-06-16 PROBLEM — M85.80 OSTEOPENIA: Status: ACTIVE | Noted: 2019-08-07

## 2022-06-16 NOTE — PHYSICAL EXAM
[Normal Sclera/Conjunctiva] : normal sclera/conjunctiva [Normal Outer Ear/Nose] : the outer ears and nose were normal in appearance [Normal Appearance] : normal in appearance [No Masses] : no palpable masses [No Nipple Discharge] : no nipple discharge [No Axillary Lymphadenopathy] : no axillary lymphadenopathy [Normal] : affect was normal and insight and judgment were intact [de-identified] : Left upper outer quadrant fibrocystic changes [de-identified] : Midline surgical scar [de-identified] : Bilateral bunions [de-identified] : Seborrheic keratosis

## 2022-06-16 NOTE — HEALTH RISK ASSESSMENT
[Good] : ~his/her~  mood as  good [Never] : Never [Yes] : Yes [2 - 3 times a week (3 pts)] : 2 - 3  times a week (3 points) [1 or 2 (0 pts)] : 1 or 2 (0 points) [Never (0 pts)] : Never (0 points) [No falls in past year] : Patient reported no falls in the past year [0] : 1) Little interest or pleasure doing things: Not at all (0) [1] : 2) Feeling down, depressed, or hopeless for several days (1) [PHQ-2 Negative - No further assessment needed] : PHQ-2 Negative - No further assessment needed [Patient reported mammogram was normal] : Patient reported mammogram was normal [Patient reported bone density results were normal] : Patient reported bone density results were normal [Patient reported colonoscopy was normal] : Patient reported colonoscopy was normal [HIV test declined] : HIV test declined [Hepatitis C test declined] : Hepatitis C test declined [None] : None [With Family] : lives with family [# of Members in Household ___] :  household currently consist of [unfilled] member(s) [Retired] : retired [College] : College [] :  [# Of Children ___] : has [unfilled] children [Feels Safe at Home] : Feels safe at home [Fully functional (bathing, dressing, toileting, transferring, walking, feeding)] : Fully functional (bathing, dressing, toileting, transferring, walking, feeding) [Fully functional (using the telephone, shopping, preparing meals, housekeeping, doing laundry, using] : Fully functional and needs no help or supervision to perform IADLs (using the telephone, shopping, preparing meals, housekeeping, doing laundry, using transportation, managing medications and managing finances) [Smoke Detector] : smoke detector [Carbon Monoxide Detector] : carbon monoxide detector [Safety elements used in home] : safety elements used in home [Seat Belt] :  uses seat belt [Caregiver Concerns] : has caregiver concerns [Audit-CScore] : 3 [de-identified] : Walks gardening [de-identified] : Healthy [[id number]] : 1 [Change in mental status noted] : No change in mental status noted [Language] : denies difficulty with language [Behavior] : denies difficulty with behavior [Learning/Retaining New Information] : denies difficulty learning/retaining new information [Handling Complex Tasks] : denies difficulty handling complex tasks [Reasoning] : denies difficulty with reasoning [Spatial Ability and Orientation] : denies difficulty with spatial ability and orientation [Sexually Active] : not sexually active [High Risk Behavior] : no high risk behavior [Reports changes in hearing] : Reports no changes in hearing [Reports changes in vision] : Reports no changes in vision [Reports normal functional visual acuity (ie: able to read med bottle)] : Reports poor functional visual acuity.  [Reports changes in dental health] : Reports no changes in dental health [Guns at Home] : no guns at home [Sunscreen] : does not use sunscreen [Travel to Developing Areas] : does not  travel to developing areas [TB Exposure] : is not being exposed to tuberculosis [MammogramDate] : December 2021 [BoneDensityDate] : 2019 [ColonoscopyDate] : 2020

## 2022-06-16 NOTE — ASSESSMENT
[FreeTextEntry1] : Patient is a 81-year-old female with history of mild coronary disease, hyperlipidemia, prediabetes, mild aortic regurgitation, mild mitral regurgitation, anxiety, hearing loss, osteopenia, vitamin D insufficiency who presents for comprehensive annual physical examination follow-up for multiple medical issues and complaint of anxiety\par \par 1 coronary artery disease\par noncritical\par continue aggressive cholesterol-lowering\par we'll discuss cardiology starting aspirin 81 mg a day\par \par 2. Hyperlipidemia\par continue pravastatin 20 and Zetia 10 mg\par LDL 82  and HDL 87\par goal LDL less than 70\par \par 3 anxiety\par patient has stress taking care of large house and elderly \par consider referral for therapy patient reluctant to start medication\par \par 4. Prediabetes\par  on diet hemoglobin A1c 5.8\par \par 5. Aortic/mitral regurgitation\par mild asymptomatic will observe\par \par 6 osteopenia\par continue calcium vitamin D check l [place] can\par \par 7 hearing loss \par has hearing aid stable\par \par 8 health maintenance\par colonoscopy virtual done two years ago\par mammogram bone density\par immunizations up-to-date\par check routine labs done

## 2022-06-16 NOTE — HISTORY OF PRESENT ILLNESS
[FreeTextEntry1] : Comprehensive annual physical examination and follow-up for hyperlipidemia noncritical coronary disease [de-identified] : Patient is an 81-year-old female with history of noncritical coronary disease catheterization October 2021, hyperlipidemia, mild mitral regurgitation, osteopenia, prediabetes hearing loss, glaucoma, vitamin D deficiency who presents for comprehensive annual physical examination and follow-up of multiple medical issues. Patient complains of stress taking care of elderly  and house notes problems sleeping changes in appetite denies depression, chest pain, shortness of breath distant exertion palpitation otherwise help feels well

## 2022-06-16 NOTE — REVIEW OF SYSTEMS
[Vision Problems] : vision problems [Hearing Loss] : hearing loss [Insomnia] : insomnia [Anxiety] : anxiety [Negative] : Heme/Lymph [Discharge] : no discharge [Pain] : no pain [Redness] : no redness [Dryness] : no dryness  [Itching] : no itching [Earache] : no earache [Nosebleed] : no nosebleeds [Hoarseness] : no hoarseness [Nasal Discharge] : no nasal discharge [Sore Throat] : no sore throat [Postnasal Drip] : no postnasal drip [Suicidal] : not suicidal [Depression] : no depression

## 2022-06-16 NOTE — REASON FOR VISIT
[Annual Wellness Visit] : an annual wellness visit [FreeTextEntry1] : Follow-up for noncritical coronary disease hyperlipidemia

## 2022-06-20 ENCOUNTER — APPOINTMENT (OUTPATIENT)
Dept: RADIOLOGY | Facility: CLINIC | Age: 82
End: 2022-06-20

## 2022-06-23 ENCOUNTER — APPOINTMENT (OUTPATIENT)
Dept: DERMATOLOGY | Facility: CLINIC | Age: 82
End: 2022-06-23
Payer: MEDICARE

## 2022-06-23 DIAGNOSIS — L70.0 ACNE VULGARIS: ICD-10-CM

## 2022-06-23 DIAGNOSIS — L81.4 OTHER MELANIN HYPERPIGMENTATION: ICD-10-CM

## 2022-06-23 PROCEDURE — 99214 OFFICE O/P EST MOD 30 MIN: CPT

## 2022-06-24 ENCOUNTER — OUTPATIENT (OUTPATIENT)
Dept: OUTPATIENT SERVICES | Facility: HOSPITAL | Age: 82
LOS: 1 days | End: 2022-06-24
Payer: MEDICARE

## 2022-06-24 ENCOUNTER — APPOINTMENT (OUTPATIENT)
Dept: RADIOLOGY | Facility: CLINIC | Age: 82
End: 2022-06-24

## 2022-06-24 DIAGNOSIS — Z98.41 CATARACT EXTRACTION STATUS, RIGHT EYE: Chronic | ICD-10-CM

## 2022-06-24 DIAGNOSIS — M85.80 OTHER SPECIFIED DISORDERS OF BONE DENSITY AND STRUCTURE, UNSPECIFIED SITE: ICD-10-CM

## 2022-06-24 DIAGNOSIS — Z98.89 OTHER SPECIFIED POSTPROCEDURAL STATES: Chronic | ICD-10-CM

## 2022-06-24 DIAGNOSIS — Z41.9 ENCOUNTER FOR PROCEDURE FOR PURPOSES OTHER THAN REMEDYING HEALTH STATE, UNSPECIFIED: Chronic | ICD-10-CM

## 2022-06-24 DIAGNOSIS — Z90.710 ACQUIRED ABSENCE OF BOTH CERVIX AND UTERUS: Chronic | ICD-10-CM

## 2022-06-24 DIAGNOSIS — K66.0 PERITONEAL ADHESIONS (POSTPROCEDURAL) (POSTINFECTION): Chronic | ICD-10-CM

## 2022-06-24 PROCEDURE — 77085 DXA BONE DENSITY AXL VRT FX: CPT

## 2022-06-24 PROCEDURE — 77085 DXA BONE DENSITY AXL VRT FX: CPT | Mod: 26

## 2022-07-06 ENCOUNTER — RX RENEWAL (OUTPATIENT)
Age: 82
End: 2022-07-06

## 2022-07-06 RX ORDER — LATANOPROST/PF 0.005 %
0.01 DROPS OPHTHALMIC (EYE)
Qty: 2.5 | Refills: 4 | Status: ACTIVE | COMMUNITY
Start: 2021-06-28 | End: 1900-01-01

## 2022-07-12 ENCOUNTER — RX RENEWAL (OUTPATIENT)
Age: 82
End: 2022-07-12

## 2022-07-13 ENCOUNTER — LABORATORY RESULT (OUTPATIENT)
Age: 82
End: 2022-07-13

## 2022-07-14 ENCOUNTER — APPOINTMENT (OUTPATIENT)
Dept: INTERNAL MEDICINE | Facility: CLINIC | Age: 82
End: 2022-07-14

## 2022-07-14 ENCOUNTER — NON-APPOINTMENT (OUTPATIENT)
Age: 82
End: 2022-07-14

## 2022-07-14 VITALS — SYSTOLIC BLOOD PRESSURE: 146 MMHG | DIASTOLIC BLOOD PRESSURE: 86 MMHG

## 2022-07-14 VITALS
OXYGEN SATURATION: 98 % | DIASTOLIC BLOOD PRESSURE: 90 MMHG | BODY MASS INDEX: 19.29 KG/M2 | WEIGHT: 120 LBS | SYSTOLIC BLOOD PRESSURE: 158 MMHG | HEART RATE: 86 BPM | HEIGHT: 66 IN | TEMPERATURE: 98.5 F | RESPIRATION RATE: 16 BRPM

## 2022-07-14 LAB
ALBUMIN SERPL ELPH-MCNC: 4.7 G/DL
ALP BLD-CCNC: 79 U/L
ALT SERPL-CCNC: 21 U/L
ANION GAP SERPL CALC-SCNC: 9 MMOL/L
AST SERPL-CCNC: 27 U/L
BASOPHILS # BLD AUTO: 0.04 K/UL
BASOPHILS NFR BLD AUTO: 0.7 %
BILIRUB SERPL-MCNC: 0.3 MG/DL
BUN SERPL-MCNC: 19 MG/DL
CALCIUM SERPL-MCNC: 9.6 MG/DL
CHLORIDE SERPL-SCNC: 106 MMOL/L
CO2 SERPL-SCNC: 28 MMOL/L
CREAT SERPL-MCNC: 0.59 MG/DL
EGFR: 91 ML/MIN/1.73M2
EOSINOPHIL # BLD AUTO: 0.19 K/UL
EOSINOPHIL NFR BLD AUTO: 3.1 %
GLUCOSE SERPL-MCNC: 104 MG/DL
HCT VFR BLD CALC: 43 %
HGB BLD-MCNC: 13.1 G/DL
IMM GRANULOCYTES NFR BLD AUTO: 0.5 %
LYMPHOCYTES # BLD AUTO: 1.43 K/UL
LYMPHOCYTES NFR BLD AUTO: 23.4 %
MAN DIFF?: NORMAL
MCHC RBC-ENTMCNC: 27.2 PG
MCHC RBC-ENTMCNC: 30.5 GM/DL
MCV RBC AUTO: 89.4 FL
MONOCYTES # BLD AUTO: 0.49 K/UL
MONOCYTES NFR BLD AUTO: 8 %
NEUTROPHILS # BLD AUTO: 3.92 K/UL
NEUTROPHILS NFR BLD AUTO: 64.3 %
PLATELET # BLD AUTO: 190 K/UL
POTASSIUM SERPL-SCNC: 4.5 MMOL/L
PROT SERPL-MCNC: 6.7 G/DL
RBC # BLD: 4.81 M/UL
RBC # FLD: 14.3 %
SODIUM SERPL-SCNC: 143 MMOL/L
WBC # FLD AUTO: 6.1 K/UL

## 2022-07-14 PROCEDURE — 99214 OFFICE O/P EST MOD 30 MIN: CPT

## 2022-07-14 NOTE — HISTORY OF PRESENT ILLNESS
[FreeTextEntry8] : \par CC: dizziness and some memory loss.\par \par HPI: the patient is a 81-year-old female with history of mild carotid artery disease, noncritical coronary disease, aortic calcification, Gerd, hypercholesterolemia, low vitamin D, hearing loss, Gerd who presents for acute problems of dizziness chronic low level worsen with bending over denies chest pain, shortness of breath fever chills sinus congestion headaches blurred vision. Patient recently fell left bending over in the [employer] [place] so dizzy we held metoprolol. Patient associates fatigue with symptoms.

## 2022-07-14 NOTE — ASSESSMENT
[FreeTextEntry1] : The patient is a 81-year-old female with history of noncritical coronary disease, noncritical carotid artery disease, mitral regurgitation mild aortic regurgitation mild prediabetes osteopenia vitamin D insufficiency and seasonal allergies who presents for complaint of lightheadedness, memory loss and fatigue\par \par 1. Lightheadedness/memory/fatigue\par labs chemistries normal CBC normal etiology unclear whether multifactorial versus focal neurological event\par MRI of the brain\par consider referral to ENT for vestibular assessment\par follow-up in 4 to 6 weeks await thyroid's\par \par 2. Memory loss\par secondary possibly to statin therapy consider DC trial\par observe for now await MRI\par \par 3 mild carotid/coronary artery/hyperlipidemia\par continue cholesterol therapy for now referral to cardiology for follow-up.

## 2022-07-18 ENCOUNTER — NON-APPOINTMENT (OUTPATIENT)
Age: 82
End: 2022-07-18

## 2022-07-18 ENCOUNTER — TRANSCRIPTION ENCOUNTER (OUTPATIENT)
Age: 82
End: 2022-07-18

## 2022-07-19 LAB
CHOLEST SERPL-MCNC: 183 MG/DL
HDLC SERPL-MCNC: 85 MG/DL
LDLC SERPL CALC-MCNC: 88 MG/DL
NONHDLC SERPL-MCNC: 99 MG/DL
TRIGL SERPL-MCNC: 55 MG/DL

## 2022-07-20 ENCOUNTER — APPOINTMENT (OUTPATIENT)
Dept: CARDIOLOGY | Facility: CLINIC | Age: 82
End: 2022-07-20

## 2022-07-20 ENCOUNTER — NON-APPOINTMENT (OUTPATIENT)
Age: 82
End: 2022-07-20

## 2022-07-20 VITALS
WEIGHT: 117 LBS | HEART RATE: 86 BPM | HEIGHT: 66 IN | BODY MASS INDEX: 18.8 KG/M2 | DIASTOLIC BLOOD PRESSURE: 75 MMHG | SYSTOLIC BLOOD PRESSURE: 125 MMHG | OXYGEN SATURATION: 99 %

## 2022-07-20 PROCEDURE — 99214 OFFICE O/P EST MOD 30 MIN: CPT

## 2022-07-20 PROCEDURE — 93000 ELECTROCARDIOGRAM COMPLETE: CPT

## 2022-07-22 ENCOUNTER — APPOINTMENT (OUTPATIENT)
Dept: MRI IMAGING | Facility: CLINIC | Age: 82
End: 2022-07-22

## 2022-07-22 ENCOUNTER — OUTPATIENT (OUTPATIENT)
Dept: OUTPATIENT SERVICES | Facility: HOSPITAL | Age: 82
LOS: 1 days | End: 2022-07-22
Payer: MEDICARE

## 2022-07-22 DIAGNOSIS — Z98.89 OTHER SPECIFIED POSTPROCEDURAL STATES: Chronic | ICD-10-CM

## 2022-07-22 DIAGNOSIS — Z98.41 CATARACT EXTRACTION STATUS, RIGHT EYE: Chronic | ICD-10-CM

## 2022-07-22 DIAGNOSIS — R41.3 OTHER AMNESIA: ICD-10-CM

## 2022-07-22 DIAGNOSIS — R42 DIZZINESS AND GIDDINESS: ICD-10-CM

## 2022-07-22 DIAGNOSIS — Z41.9 ENCOUNTER FOR PROCEDURE FOR PURPOSES OTHER THAN REMEDYING HEALTH STATE, UNSPECIFIED: Chronic | ICD-10-CM

## 2022-07-22 DIAGNOSIS — K66.0 PERITONEAL ADHESIONS (POSTPROCEDURAL) (POSTINFECTION): Chronic | ICD-10-CM

## 2022-07-22 DIAGNOSIS — Z90.710 ACQUIRED ABSENCE OF BOTH CERVIX AND UTERUS: Chronic | ICD-10-CM

## 2022-07-22 PROCEDURE — 70551 MRI BRAIN STEM W/O DYE: CPT

## 2022-07-22 PROCEDURE — 70551 MRI BRAIN STEM W/O DYE: CPT | Mod: 26,MH

## 2022-08-01 ENCOUNTER — APPOINTMENT (OUTPATIENT)
Dept: CARDIOLOGY | Facility: CLINIC | Age: 82
End: 2022-08-01

## 2022-08-09 NOTE — DISCUSSION/SUMMARY
[EKG obtained to assist in diagnosis and management of assessed problem(s)] : EKG obtained to assist in diagnosis and management of assessed problem(s) [FreeTextEntry1] : Get the MRI as per Patsy\par If ok.willl try different statin and ultimately considetr PCSK-9 given CAD. \par Encourage hydration as some symptoms may be related to this (cramping and dizziness with position change)

## 2022-08-09 NOTE — HISTORY OF PRESENT ILLNESS
[FreeTextEntry1] : Cholesterol on Pravastatin and Ezetimide\par She notes has been bad with her diet. \par She notes she has been foggy since being on cholesterol medication. \par She also has dizziness. No syncope. These are worse with position changes. \par CAD with severe diag disease. \par 6 months

## 2022-09-08 ENCOUNTER — NON-APPOINTMENT (OUTPATIENT)
Age: 82
End: 2022-09-08

## 2022-09-08 LAB
ALBUMIN SERPL ELPH-MCNC: 4.3 G/DL
ALP BLD-CCNC: 80 U/L
ALT SERPL-CCNC: 22 U/L
ANION GAP SERPL CALC-SCNC: 14 MMOL/L
AST SERPL-CCNC: 27 U/L
BILIRUB SERPL-MCNC: 0.5 MG/DL
BUN SERPL-MCNC: 14 MG/DL
CALCIUM SERPL-MCNC: 9.6 MG/DL
CHLORIDE SERPL-SCNC: 105 MMOL/L
CHOLEST SERPL-MCNC: 183 MG/DL
CO2 SERPL-SCNC: 25 MMOL/L
CREAT SERPL-MCNC: 0.54 MG/DL
EGFR: 92 ML/MIN/1.73M2
GLUCOSE SERPL-MCNC: 95 MG/DL
HDLC SERPL-MCNC: 83 MG/DL
LDLC SERPL CALC-MCNC: 89 MG/DL
NONHDLC SERPL-MCNC: 100 MG/DL
POTASSIUM SERPL-SCNC: 4.4 MMOL/L
PROT SERPL-MCNC: 6.1 G/DL
SODIUM SERPL-SCNC: 144 MMOL/L
TRIGL SERPL-MCNC: 55 MG/DL

## 2022-09-16 ENCOUNTER — APPOINTMENT (OUTPATIENT)
Dept: DERMATOLOGY | Facility: CLINIC | Age: 82
End: 2022-09-16

## 2022-10-04 ENCOUNTER — APPOINTMENT (OUTPATIENT)
Dept: INTERNAL MEDICINE | Facility: CLINIC | Age: 82
End: 2022-10-04

## 2022-10-04 ENCOUNTER — NON-APPOINTMENT (OUTPATIENT)
Age: 82
End: 2022-10-04

## 2022-10-04 ENCOUNTER — RX RENEWAL (OUTPATIENT)
Age: 82
End: 2022-10-04

## 2022-10-04 VITALS
HEART RATE: 53 BPM | TEMPERATURE: 97.7 F | DIASTOLIC BLOOD PRESSURE: 76 MMHG | WEIGHT: 117 LBS | SYSTOLIC BLOOD PRESSURE: 176 MMHG | HEIGHT: 66 IN | OXYGEN SATURATION: 95 % | BODY MASS INDEX: 18.8 KG/M2

## 2022-10-04 VITALS — DIASTOLIC BLOOD PRESSURE: 66 MMHG | SYSTOLIC BLOOD PRESSURE: 150 MMHG

## 2022-10-04 DIAGNOSIS — I34.0 NONRHEUMATIC MITRAL (VALVE) INSUFFICIENCY: ICD-10-CM

## 2022-10-04 PROCEDURE — 93000 ELECTROCARDIOGRAM COMPLETE: CPT

## 2022-10-04 PROCEDURE — 99214 OFFICE O/P EST MOD 30 MIN: CPT | Mod: 25

## 2022-10-04 PROCEDURE — G0008: CPT

## 2022-10-04 PROCEDURE — 90662 IIV NO PRSV INCREASED AG IM: CPT

## 2022-10-04 PROCEDURE — 36415 COLL VENOUS BLD VENIPUNCTURE: CPT

## 2022-10-06 LAB
ALBUMIN SERPL ELPH-MCNC: 5.2 G/DL
ALP BLD-CCNC: 94 U/L
ALT SERPL-CCNC: 37 U/L
ANION GAP SERPL CALC-SCNC: 14 MMOL/L
AST SERPL-CCNC: 43 U/L
BASOPHILS # BLD AUTO: 0.03 K/UL
BASOPHILS NFR BLD AUTO: 0.5 %
BILIRUB SERPL-MCNC: 0.5 MG/DL
BUN SERPL-MCNC: 17 MG/DL
CALCIUM SERPL-MCNC: 10.5 MG/DL
CHLORIDE SERPL-SCNC: 103 MMOL/L
CHOLEST SERPL-MCNC: 171 MG/DL
CO2 SERPL-SCNC: 26 MMOL/L
CREAT SERPL-MCNC: 0.55 MG/DL
EGFR: 92 ML/MIN/1.73M2
EOSINOPHIL # BLD AUTO: 0.24 K/UL
EOSINOPHIL NFR BLD AUTO: 3.8 %
GLUCOSE SERPL-MCNC: 103 MG/DL
HCT VFR BLD CALC: 44.2 %
HDLC SERPL-MCNC: 83 MG/DL
HGB BLD-MCNC: 13.7 G/DL
IMM GRANULOCYTES NFR BLD AUTO: 0.2 %
LDLC SERPL CALC-MCNC: 77 MG/DL
LYMPHOCYTES # BLD AUTO: 1.88 K/UL
LYMPHOCYTES NFR BLD AUTO: 29.9 %
MAGNESIUM SERPL-MCNC: 2.1 MG/DL
MAN DIFF?: NORMAL
MCHC RBC-ENTMCNC: 28.3 PG
MCHC RBC-ENTMCNC: 31 GM/DL
MCV RBC AUTO: 91.3 FL
MONOCYTES # BLD AUTO: 0.57 K/UL
MONOCYTES NFR BLD AUTO: 9.1 %
NEUTROPHILS # BLD AUTO: 3.55 K/UL
NEUTROPHILS NFR BLD AUTO: 56.5 %
NONHDLC SERPL-MCNC: 88 MG/DL
NT-PROBNP SERPL-MCNC: 276 PG/ML
PLATELET # BLD AUTO: 165 K/UL
POTASSIUM SERPL-SCNC: 4.7 MMOL/L
PROT SERPL-MCNC: 7.4 G/DL
RBC # BLD: 4.84 M/UL
RBC # FLD: 14.4 %
SODIUM SERPL-SCNC: 143 MMOL/L
TRIGL SERPL-MCNC: 56 MG/DL
TROPONIN I SERPL-MCNC: <0.01 NG/ML
TSH SERPL-ACNC: 1.15 UIU/ML
WBC # FLD AUTO: 6.28 K/UL

## 2022-10-10 ENCOUNTER — OUTPATIENT (OUTPATIENT)
Dept: OUTPATIENT SERVICES | Facility: HOSPITAL | Age: 82
LOS: 1 days | End: 2022-10-10
Payer: MEDICARE

## 2022-10-10 ENCOUNTER — APPOINTMENT (OUTPATIENT)
Dept: CV DIAGNOSITCS | Facility: HOSPITAL | Age: 82
End: 2022-10-10

## 2022-10-10 DIAGNOSIS — Z90.710 ACQUIRED ABSENCE OF BOTH CERVIX AND UTERUS: Chronic | ICD-10-CM

## 2022-10-10 DIAGNOSIS — K66.0 PERITONEAL ADHESIONS (POSTPROCEDURAL) (POSTINFECTION): Chronic | ICD-10-CM

## 2022-10-10 DIAGNOSIS — Z98.89 OTHER SPECIFIED POSTPROCEDURAL STATES: Chronic | ICD-10-CM

## 2022-10-10 DIAGNOSIS — Z98.41 CATARACT EXTRACTION STATUS, RIGHT EYE: Chronic | ICD-10-CM

## 2022-10-10 DIAGNOSIS — Z41.9 ENCOUNTER FOR PROCEDURE FOR PURPOSES OTHER THAN REMEDYING HEALTH STATE, UNSPECIFIED: Chronic | ICD-10-CM

## 2022-10-10 DIAGNOSIS — R07.9 CHEST PAIN, UNSPECIFIED: ICD-10-CM

## 2022-10-10 PROBLEM — I34.0 MILD MITRAL REGURGITATION: Status: ACTIVE | Noted: 2018-06-15

## 2022-10-10 PROCEDURE — 93306 TTE W/DOPPLER COMPLETE: CPT | Mod: 26

## 2022-10-10 PROCEDURE — 93306 TTE W/DOPPLER COMPLETE: CPT

## 2022-10-10 NOTE — PHYSICAL EXAM
[Normal Sclera/Conjunctiva] : normal sclera/conjunctiva [Normal Outer Ear/Nose] : the outer ears and nose were normal in appearance [Normal Oropharynx] : the oropharynx was normal [Normal] : affect was normal and insight and judgment were intact

## 2022-10-10 NOTE — ASSESSMENT
[FreeTextEntry1] : Patient is a 82-year-old female with history of coronary artery disease noncritical on calf, hypercholesterolemia, history of lightheadedness, glaucoma, dense breasts,  mild mitral and aortic regurgitation, low vitamin D, osteopenia prediabetes who presents with complaint of chest pain lightheadedness and found incidentally Bigeminy on EKG\par \par 1. Chest pain\par given known cardiac history will arrange for nuclear stress test\par EKG shows no ischemic changes\par check troponin\par \par 2. Bigeminy\par check electrolytes troponin TFTs schedule echocardiogram\par \par 3 lightheadedness\par positive Hallpike Barany maneuver left ear down\par consider referral to ENT\par \par 4. Mild mitral regurgitation/aortic regurgitation\par check echocardiogram.\par \par

## 2022-10-10 NOTE — HISTORY OF PRESENT ILLNESS
[FreeTextEntry8] : \par \par CC: chest pain and dizziness\par \par HPI the patient is a 82-year-old female with history of coronary artery disease 80% left  first diagonal glaucoma, hearing loss, hypercalcemia, anxiety, colonic polyps, dense breast history of mild aortic regurgitation history of mild mitral regurgitation who presents for complaint of one episode yesterday of chest pain and persistent lightheadedness dizziness. Patient describes the pain as left sided substernal associated with some shortness of breath diet denied diaphoresis lightheadedness dizziness pain lasting several hours has not reoccurred. Dizziness has been chronic occasional when suddenly getting up or with quick motion of the head.

## 2022-10-11 ENCOUNTER — OUTPATIENT (OUTPATIENT)
Dept: OUTPATIENT SERVICES | Facility: HOSPITAL | Age: 82
LOS: 1 days | End: 2022-10-11
Payer: MEDICARE

## 2022-10-11 ENCOUNTER — APPOINTMENT (OUTPATIENT)
Dept: CV DIAGNOSTICS | Facility: HOSPITAL | Age: 82
End: 2022-10-11

## 2022-10-11 DIAGNOSIS — Z90.710 ACQUIRED ABSENCE OF BOTH CERVIX AND UTERUS: Chronic | ICD-10-CM

## 2022-10-11 DIAGNOSIS — Z98.89 OTHER SPECIFIED POSTPROCEDURAL STATES: Chronic | ICD-10-CM

## 2022-10-11 DIAGNOSIS — Z41.9 ENCOUNTER FOR PROCEDURE FOR PURPOSES OTHER THAN REMEDYING HEALTH STATE, UNSPECIFIED: Chronic | ICD-10-CM

## 2022-10-11 DIAGNOSIS — Z98.41 CATARACT EXTRACTION STATUS, RIGHT EYE: Chronic | ICD-10-CM

## 2022-10-11 DIAGNOSIS — R07.9 CHEST PAIN, UNSPECIFIED: ICD-10-CM

## 2022-10-11 DIAGNOSIS — K66.0 PERITONEAL ADHESIONS (POSTPROCEDURAL) (POSTINFECTION): Chronic | ICD-10-CM

## 2022-10-11 PROCEDURE — 78452 HT MUSCLE IMAGE SPECT MULT: CPT | Mod: MH

## 2022-10-11 PROCEDURE — 78452 HT MUSCLE IMAGE SPECT MULT: CPT | Mod: 26,MH

## 2022-10-11 PROCEDURE — 93017 CV STRESS TEST TRACING ONLY: CPT

## 2022-10-11 PROCEDURE — A9500: CPT

## 2022-10-11 PROCEDURE — 93018 CV STRESS TEST I&R ONLY: CPT

## 2022-10-11 PROCEDURE — 93016 CV STRESS TEST SUPVJ ONLY: CPT

## 2022-10-20 ENCOUNTER — APPOINTMENT (OUTPATIENT)
Dept: INTERNAL MEDICINE | Facility: CLINIC | Age: 82
End: 2022-10-20

## 2022-10-20 VITALS
TEMPERATURE: 97.3 F | SYSTOLIC BLOOD PRESSURE: 131 MMHG | WEIGHT: 119 LBS | DIASTOLIC BLOOD PRESSURE: 77 MMHG | HEIGHT: 66 IN | HEART RATE: 84 BPM | OXYGEN SATURATION: 99 % | BODY MASS INDEX: 19.13 KG/M2 | RESPIRATION RATE: 16 BRPM

## 2022-10-20 DIAGNOSIS — K21.9 GASTRO-ESOPHAGEAL REFLUX DISEASE W/OUT ESOPHAGITIS: ICD-10-CM

## 2022-10-20 PROCEDURE — 99214 OFFICE O/P EST MOD 30 MIN: CPT

## 2022-10-20 RX ORDER — IPRATROPIUM BROMIDE 21 UG/1
0.03 SPRAY NASAL 3 TIMES DAILY
Qty: 1 | Refills: 0 | Status: DISCONTINUED | COMMUNITY
Start: 2022-09-22 | End: 2022-10-20

## 2022-10-20 RX ORDER — ESOMEPRAZOLE MAGNESIUM 40 MG/1
40 CAPSULE, DELAYED RELEASE ORAL
Qty: 30 | Refills: 1 | Status: DISCONTINUED | COMMUNITY
Start: 2022-10-20 | End: 2022-10-20

## 2022-10-20 NOTE — PHYSICAL EXAM
[Normal Sclera/Conjunctiva] : normal sclera/conjunctiva [Normal Outer Ear/Nose] : the outer ears and nose were normal in appearance [Normal TMs] : both tympanic membranes were normal [No JVD] : no jugular venous distention [No Lymphadenopathy] : no lymphadenopathy [Supple] : supple [Normal Rate] : normal rate  [Regular Rhythm] : with a regular rhythm [Normal S1, S2] : normal S1 and S2 [Normal] : affect was normal and insight and judgment were intact [de-identified] : 2/6 systolic murmur [de-identified] : Positive help type maneuver left ear down

## 2022-10-20 NOTE — ASSESSMENT
[FreeTextEntry1] : Patient is a 82-year-old female with coronary artery disease, mitral/aortic regurgitation mild, aortic calcification carotid noncritical, hearing loss, esophageal reflux, dense breasts, osteopenia who presents for follow-up\par \par 1. Chest pain\par no further chest pain however will empirically treat for reflux disease\par starting Nexium 40 mg once a day\par counseled on lifestyle modification\par \par 2. Coronary disease\par discuss with cardiology medical management\par aggressive cholesterol-lowering continue metoprolol ER 25 mg once a day aspirin 81 mg\par sublingual nitro PRN observe closely\par \par 3. Vertigo\par seems peripheral will refer to ENT for further evaluation possible vestibular rehab\par \par 4. Hyperlipidemia\par on Pravachol 40 and Zetia 10\par goal LDL less than 70\par \par 5 health maintenance\par receipt flu COVID booster tomorrow

## 2022-10-20 NOTE — HISTORY OF PRESENT ILLNESS
[FreeTextEntry1] : Follow-up for coronary disease, dizziness, hypercholesterolemia [de-identified] : The patient is an 82-year-old female with history of coronary artery disease noncritical, carotid artery disease\par critical, esophageal reflux, Gerd, mild aortic/mitral regurgitation and osteopenia who presents for follow-up patient had stress test showing residual area of reperfusion defect. Presently she denies no further chest pain but still complains of intermittent dizziness and what she describes as Gerd after eating. She denies shortness of breath palpitation lightheadedness dizziness

## 2022-10-20 NOTE — REVIEW OF SYSTEMS
[Heartburn] : heartburn [Dizziness] : dizziness [Negative] : Musculoskeletal [Abdominal Pain] : no abdominal pain [Nausea] : no nausea [Constipation] : no constipation [Diarrhea] : diarrhea [Vomiting] : no vomiting [Melena] : no melena [Headache] : no headache [Fainting] : no fainting [Confusion] : no confusion [Memory Loss] : no memory loss [Unsteady Walking] : no ataxia

## 2022-12-08 ENCOUNTER — LABORATORY RESULT (OUTPATIENT)
Age: 82
End: 2022-12-08

## 2022-12-17 ENCOUNTER — RX RENEWAL (OUTPATIENT)
Age: 82
End: 2022-12-17

## 2022-12-22 ENCOUNTER — NON-APPOINTMENT (OUTPATIENT)
Age: 82
End: 2022-12-22

## 2022-12-26 RX ORDER — BENZONATATE 200 MG/1
200 CAPSULE ORAL 3 TIMES DAILY
Qty: 21 | Refills: 1 | Status: COMPLETED | COMMUNITY
Start: 2022-12-26 | End: 2023-01-09

## 2022-12-27 ENCOUNTER — APPOINTMENT (OUTPATIENT)
Dept: INTERNAL MEDICINE | Facility: CLINIC | Age: 82
End: 2022-12-27

## 2022-12-27 VITALS
HEIGHT: 66 IN | OXYGEN SATURATION: 95 % | BODY MASS INDEX: 18.8 KG/M2 | TEMPERATURE: 98.1 F | HEART RATE: 86 BPM | WEIGHT: 117 LBS | DIASTOLIC BLOOD PRESSURE: 62 MMHG | SYSTOLIC BLOOD PRESSURE: 134 MMHG

## 2022-12-27 PROCEDURE — 99213 OFFICE O/P EST LOW 20 MIN: CPT

## 2022-12-27 NOTE — ASSESSMENT
[FreeTextEntry1] : Patient is a 82-year-old female with history of noncritical coronary disease aortic calcification, noncritical carotid disease hypercholesterolemia, Gerd, dense breasts, osteopenia who presents with one week history of sore throat, rhinorrhea, sinus pain and laryngitis\par \par \par \par 1. Acute sinusitis\par continue Zithromax check respiratory swab rule out COVID\par Flonase, Atrovent nasal spray\par tessalon cough\par if fails improve call back\par \par 2 laryngitis\par most likely viral versus chronic cough sore throat\par salt water goggle rest\par \par 3. Hyperlipidemia\par continue Pravachol 80 will check lipid profile

## 2022-12-27 NOTE — HISTORY OF PRESENT ILLNESS
[FreeTextEntry8] : CC: sore throat nasal congestion pain at cough\par HPI. The patient is a 82-year-old female with noncritical coronary disease, aortic calcification, peripheral vascular disease  hypercholesterolemia dense breast mild mitral regurgitation/aortic regurgitation, osteopenia who presents with one week history of sore throat followed by nasal congestion pain rhinorrhea and cough laryngitis. Patient called one week ago complaint of severe sore throat followed by nasal congestion sinus headache and laryngitis started initially on AP troponin Flonase salt water goggle with minimal improvement pain became so severe started Zithromax Z Pierre patient now notes improvement lessening pain pressure slight cough no fever chills tested COVID negative.\par

## 2022-12-27 NOTE — REVIEW OF SYSTEMS
[Earache] : no earache [Hearing Loss] : no hearing loss [Nosebleed] : no nosebleeds [Hoarseness] : no hoarseness [Nasal Discharge] : nasal discharge [Sore Throat] : sore throat [Postnasal Drip] : postnasal drip [Wheezing] : no wheezing [Cough] : cough [Dyspnea on Exertion] : no dyspnea on exertion [Negative] : Gastrointestinal

## 2022-12-27 NOTE — PHYSICAL EXAM
[Normal Sclera/Conjunctiva] : normal sclera/conjunctiva [Normal Oropharynx] : the oropharynx was normal [Normal TMs] : both tympanic membranes were normal [Normal] : soft, non-tender, non-distended, no masses palpated, no HSM and normal bowel sounds [de-identified] : Nasal turbinates swollen erythematous clear mucus

## 2022-12-28 LAB
RAPID RVP RESULT: NOT DETECTED
SARS-COV-2 RNA PNL RESP NAA+PROBE: NOT DETECTED

## 2023-01-10 ENCOUNTER — APPOINTMENT (OUTPATIENT)
Dept: DERMATOLOGY | Facility: CLINIC | Age: 83
End: 2023-01-10
Payer: MEDICARE

## 2023-01-10 PROCEDURE — 17111 DESTRUCTION B9 LESIONS 15/>: CPT | Mod: 59

## 2023-01-10 PROCEDURE — 17000 DESTRUCT PREMALG LESION: CPT | Mod: 59

## 2023-01-10 PROCEDURE — 99214 OFFICE O/P EST MOD 30 MIN: CPT | Mod: 25

## 2023-01-10 PROCEDURE — 17003 DESTRUCT PREMALG LES 2-14: CPT | Mod: 59

## 2023-01-12 ENCOUNTER — APPOINTMENT (OUTPATIENT)
Dept: OTOLARYNGOLOGY | Facility: CLINIC | Age: 83
End: 2023-01-12
Payer: MEDICARE

## 2023-01-12 VITALS
SYSTOLIC BLOOD PRESSURE: 113 MMHG | WEIGHT: 116 LBS | HEIGHT: 67 IN | DIASTOLIC BLOOD PRESSURE: 69 MMHG | BODY MASS INDEX: 18.21 KG/M2 | HEART RATE: 58 BPM

## 2023-01-12 DIAGNOSIS — H90.3 SENSORINEURAL HEARING LOSS, BILATERAL: ICD-10-CM

## 2023-01-12 PROCEDURE — 99213 OFFICE O/P EST LOW 20 MIN: CPT

## 2023-01-12 RX ORDER — AZITHROMYCIN 250 MG/1
250 TABLET, FILM COATED ORAL
Qty: 1 | Refills: 0 | Status: DISCONTINUED | COMMUNITY
Start: 2022-12-26 | End: 2023-01-12

## 2023-01-12 RX ORDER — METOPROLOL SUCCINATE 25 MG/1
25 TABLET, EXTENDED RELEASE ORAL
Qty: 90 | Refills: 1 | Status: DISCONTINUED | COMMUNITY
Start: 2021-12-27 | End: 2023-01-12

## 2023-01-12 NOTE — PROCEDURE
[Same] : same as the Pre Op Dx. [FreeTextEntry1] : Clogged ears and Hx of vertigo [FreeTextEntry4] : none

## 2023-01-12 NOTE — PHYSICAL EXAM
[Binocular Microscopic Exam] : Binocular microscopic exam was performed [Midline] : trachea located in midline position [Normal] : no rashes [Hearing Loss Right Only] : normal [Hearing Loss Left Only] : normal [Nystagmus] : ~T no ~M nystagmus was seen [Fukuda Step Test] : Fukuda Step Test was Negative [Romberg's Sign] : Romberg's sign was absent [Fistula Sign] : Fistula Sign: Negative [Past-Pointing] : Past-Pointing: Negative [[place]-Hallangelineke] : [place]-Hallpike: Negative

## 2023-01-12 NOTE — HISTORY OF PRESENT ILLNESS
[de-identified] : 82 year old woman presents for initial evaluation for clogged ears. \par Hx of vertigo--last episode was 4 months ago and was probably due to medication changes as per patient.\par Reports both ears feel pretty clogged. Has been wearing hearing aids for about 2 years--not wearing them now.\par Denies otalgia, otorrhea, recent fevers or ear infections, tinnitus, headaches related to hearing.

## 2023-01-23 ENCOUNTER — APPOINTMENT (OUTPATIENT)
Dept: CARDIOLOGY | Facility: CLINIC | Age: 83
End: 2023-01-23
Payer: MEDICARE

## 2023-01-23 ENCOUNTER — NON-APPOINTMENT (OUTPATIENT)
Age: 83
End: 2023-01-23

## 2023-01-23 VITALS
OXYGEN SATURATION: 99 % | WEIGHT: 116.25 LBS | SYSTOLIC BLOOD PRESSURE: 137 MMHG | HEART RATE: 78 BPM | BODY MASS INDEX: 18.25 KG/M2 | DIASTOLIC BLOOD PRESSURE: 70 MMHG | HEIGHT: 67 IN

## 2023-01-23 DIAGNOSIS — I70.0 ATHEROSCLEROSIS OF AORTA: ICD-10-CM

## 2023-01-23 PROCEDURE — 93000 ELECTROCARDIOGRAM COMPLETE: CPT

## 2023-01-23 PROCEDURE — 99214 OFFICE O/P EST MOD 30 MIN: CPT

## 2023-01-26 ENCOUNTER — APPOINTMENT (OUTPATIENT)
Dept: OTOLARYNGOLOGY | Facility: CLINIC | Age: 83
End: 2023-01-26

## 2023-01-31 LAB
ALBUMIN SERPL ELPH-MCNC: 4.4 G/DL
ALP BLD-CCNC: 89 U/L
ALT SERPL-CCNC: 20 U/L
ANION GAP SERPL CALC-SCNC: 12 MMOL/L
AST SERPL-CCNC: 24 U/L
BILIRUB SERPL-MCNC: 0.3 MG/DL
BUN SERPL-MCNC: 17 MG/DL
CALCIUM SERPL-MCNC: 10 MG/DL
CHLORIDE SERPL-SCNC: 104 MMOL/L
CHOLEST SERPL-MCNC: 161 MG/DL
CO2 SERPL-SCNC: 25 MMOL/L
CREAT SERPL-MCNC: 0.56 MG/DL
EGFR: 92 ML/MIN/1.73M2
GLUCOSE SERPL-MCNC: 101 MG/DL
HDLC SERPL-MCNC: 72 MG/DL
LDLC SERPL CALC-MCNC: 76 MG/DL
MAGNESIUM SERPL-MCNC: 2 MG/DL
NONHDLC SERPL-MCNC: 89 MG/DL
POTASSIUM SERPL-SCNC: 4.6 MMOL/L
PROT SERPL-MCNC: 6.5 G/DL
SODIUM SERPL-SCNC: 142 MMOL/L
TRIGL SERPL-MCNC: 66 MG/DL

## 2023-02-09 ENCOUNTER — APPOINTMENT (OUTPATIENT)
Dept: OTOLARYNGOLOGY | Facility: CLINIC | Age: 83
End: 2023-02-09
Payer: MEDICARE

## 2023-02-09 VITALS
HEART RATE: 74 BPM | TEMPERATURE: 97.1 F | SYSTOLIC BLOOD PRESSURE: 135 MMHG | HEIGHT: 66 IN | WEIGHT: 116 LBS | OXYGEN SATURATION: 97 % | DIASTOLIC BLOOD PRESSURE: 82 MMHG | BODY MASS INDEX: 18.64 KG/M2

## 2023-02-09 PROCEDURE — 99213 OFFICE O/P EST LOW 20 MIN: CPT | Mod: 25

## 2023-02-09 PROCEDURE — 31575 DIAGNOSTIC LARYNGOSCOPY: CPT

## 2023-02-15 NOTE — HISTORY OF PRESENT ILLNESS
[de-identified] : 82 year old female with chronic throat discomfort, globus sensation. She is constantly throat clearing. She has been taking omeprazole - started again 3-4 months ago. She has been taking it at night time after dinner. She does not have a GI doctor - she had an endoscopy many years ago and it was okay. \par She denies pain with swallowing or sore throat. \par \par She also feels like she also has constant post nasal drip, rhinorrhea. She was taking flonase, atrovent which did help. She was recc salt water gargles but did not try\par She does not use a humidifier.

## 2023-02-15 NOTE — PROCEDURE
[FreeTextEntry1] : Flexible laryngoscopy [FreeTextEntry2] : Sore throat [FreeTextEntry3] : Procedure: Flexible fiberoptic laryngoscopy\par \par Pre-operative diagnosis: \par \par Indication: unable to tolerate mirror exam\par \par Details:\par After decongestant and lidocaine was sprayed in the bilateral nasal cavities, a flexible laryngoscope was inserted into the right nares. The nasal cavity, middle meatus, nasopharynx, and glottis were visualized. The endoscope was then inserted into the left nares and the nasal cavity was visualized. The patient tolerated procedure well.\par \par Results:\par Right nasal cavity: clear without masses or lesions, clear secretions\par Right inferior turbinate: ITH\par Right middle turbinate: normal\par Right middle meatus: normal without masses, pus\par Right ETO: normal\par Left nasal cavity: clear without masses or lesions, clear secretions\par Left inferior turbinate: ITH\par Left middle turbinate: normal\par Left middle meatus: normal without masses, pus\par Left ETO: normal\par Nasopharynx: normal without masses or lesions\par Base of tongue: clear\par Vallecula: Clear\par Secretions: normal\par Glottis: Vocal cords mobile and symmetric, piriform sinus clear, normal AE folds, arytenoids\par Normal appearing subglottis. Postcricoid edema, vc edema.\par

## 2023-02-15 NOTE — ASSESSMENT
[FreeTextEntry1] : 82 year old female with chronic rhinitis, mild to moderate LPR. She will continue omeprazole 40 mg daily before first meal. In addition, to start NSS 3-4 times a day. I do think her symptoms will improve with continue omeprazole use.

## 2023-03-02 PROBLEM — I70.0 AORTIC ATHEROSCLEROSIS: Status: ACTIVE | Noted: 2021-06-23

## 2023-03-02 NOTE — HISTORY OF PRESENT ILLNESS
[FreeTextEntry1] : Ms. GORDON has a history of CAD. He denies a change in symptoms. He has no chest pain. No SOB. No palpitations. \par She is being medically managed. She is on max Pravachol and zetia. No symptoms reported. \par One night only of feeling her heart pounding.

## 2023-03-02 NOTE — PHYSICAL EXAM
[Well Developed] : well developed [Well Nourished] : well nourished [No Acute Distress] : no acute distress [Normal Conjunctiva] : normal conjunctiva [Normal Venous Pressure] : normal venous pressure [No Carotid Bruit] : no carotid bruit [Normal S1, S2] : normal S1, S2 [No Murmur] : no murmur [No Rub] : no rub [No Gallop] : no gallop [Clear Lung Fields] : clear lung fields [Good Air Entry] : good air entry [No Respiratory Distress] : no respiratory distress  [Soft] : abdomen soft [Non Tender] : non-tender [No Masses/organomegaly] : no masses/organomegaly [Normal Bowel Sounds] : normal bowel sounds [Normal Gait] : normal gait [No Edema] : no edema [No Cyanosis] : no cyanosis [No Clubbing] : no clubbing [No Varicosities] : no varicosities [No Rash] : no rash [No Skin Lesions] : no skin lesions [Moves all extremities] : moves all extremities [No Focal Deficits] : no focal deficits [Normal Speech] : normal speech [Alert and Oriented] : alert and oriented [Normal memory] : normal memory [de-identified] : Ir IR about 1 in 7 beats is PVC.

## 2023-03-02 NOTE — DISCUSSION/SUMMARY
[FreeTextEntry1] : Check labs to assure no electrolyte abnormality given ectopy. \par Check lipids.\par Further testing pending results of above.  [EKG obtained to assist in diagnosis and management of assessed problem(s)] : EKG obtained to assist in diagnosis and management of assessed problem(s)

## 2023-03-07 ENCOUNTER — APPOINTMENT (OUTPATIENT)
Dept: INTERNAL MEDICINE | Facility: CLINIC | Age: 83
End: 2023-03-07
Payer: MEDICARE

## 2023-03-07 VITALS
HEIGHT: 66 IN | BODY MASS INDEX: 17.84 KG/M2 | WEIGHT: 111 LBS | TEMPERATURE: 97.6 F | SYSTOLIC BLOOD PRESSURE: 126 MMHG | HEART RATE: 80 BPM | DIASTOLIC BLOOD PRESSURE: 68 MMHG | OXYGEN SATURATION: 98 %

## 2023-03-07 DIAGNOSIS — Z87.898 PERSONAL HISTORY OF OTHER SPECIFIED CONDITIONS: ICD-10-CM

## 2023-03-07 DIAGNOSIS — R42 DIZZINESS AND GIDDINESS: ICD-10-CM

## 2023-03-07 DIAGNOSIS — Z87.2 PERSONAL HISTORY OF DISEASES OF THE SKIN AND SUBCUTANEOUS TISSUE: ICD-10-CM

## 2023-03-07 DIAGNOSIS — L24.9 IRRITANT CONTACT DERMATITIS, UNSPECIFIED CAUSE: ICD-10-CM

## 2023-03-07 DIAGNOSIS — Z12.11 ENCOUNTER FOR SCREENING FOR MALIGNANT NEOPLASM OF COLON: ICD-10-CM

## 2023-03-07 DIAGNOSIS — Z92.29 PERSONAL HISTORY OF OTHER DRUG THERAPY: ICD-10-CM

## 2023-03-07 DIAGNOSIS — R25.1 TREMOR, UNSPECIFIED: ICD-10-CM

## 2023-03-07 DIAGNOSIS — H90.5 UNSPECIFIED SENSORINEURAL HEARING LOSS: ICD-10-CM

## 2023-03-07 DIAGNOSIS — Z87.09 PERSONAL HISTORY OF OTHER DISEASES OF THE RESPIRATORY SYSTEM: ICD-10-CM

## 2023-03-07 DIAGNOSIS — R79.89 OTHER SPECIFIED ABNORMAL FINDINGS OF BLOOD CHEMISTRY: ICD-10-CM

## 2023-03-07 PROCEDURE — 36415 COLL VENOUS BLD VENIPUNCTURE: CPT

## 2023-03-07 PROCEDURE — 93000 ELECTROCARDIOGRAM COMPLETE: CPT

## 2023-03-07 PROCEDURE — 99214 OFFICE O/P EST MOD 30 MIN: CPT | Mod: 25

## 2023-03-07 NOTE — HISTORY OF PRESENT ILLNESS
[FreeTextEntry8] : \par \par CC: feeling tremulous anxious palpitations for the past few days\par HPI patient is a 82-year-old female of coronary artery disease, aortic calcificati dense breasts, on, hearing loss, hyperlipidemia, mild aortic regurgitation mild mitral regurgitation who presents with several week history of stresses  getting old at 89 years old, issues with the house gardening poor sleep who presents with anxiety, feeling  denies chest pain, fearful of getting old  passing away

## 2023-03-07 NOTE — ASSESSMENT
[FreeTextEntry1] : It is a 82-year-old female with history of coronary artery disease, hyperlipidemia, prediabetes, mild mitral and aortic regurgitation, hearing loss, aortic calcification, Gerd, prediabetes, who presents complaining of anxiety, tremulous, palpitations\par \par 1 palpitation/bigeminy\par having more frequent PVCs in by Zoran\par will discuss cardiology starting beta-blocker low-dose\par check CBC electrolytes thyroid's\par no ischemic changes on EKG done today by Marylu\par \par 2 tremulous\par most likely due to stress\par check CBC electrolytes thyroid\par try to reassure patient.\par \par 3 stress/anxiety\par start low-dose Celexa had bad experience with Zoloft in the past\par Will observe and follow-up closely follow-up in four weeks\par \par 4 coronary disease\par no chest pain or ischemic changes on EKG done today\par continue Pravachol and Zetia check lipid profile\par \par 5 prediabetes\par counseled on diet check glucose\par all labs drawn in office today EKG done in office today\par follow-up in four weeks

## 2023-03-07 NOTE — REVIEW OF SYSTEMS
[Insomnia] : insomnia [Anxiety] : anxiety [Negative] : Neurological [Suicidal] : not suicidal [Depression] : no depression

## 2023-03-08 LAB
ALBUMIN SERPL ELPH-MCNC: 4.6 G/DL
ALP BLD-CCNC: 81 U/L
ALT SERPL-CCNC: 23 U/L
ANION GAP SERPL CALC-SCNC: 14 MMOL/L
AST SERPL-CCNC: 25 U/L
BASOPHILS # BLD AUTO: 0.03 K/UL
BASOPHILS NFR BLD AUTO: 0.5 %
BILIRUB SERPL-MCNC: 0.5 MG/DL
BUN SERPL-MCNC: 17 MG/DL
CALCIUM SERPL-MCNC: 9.9 MG/DL
CHLORIDE SERPL-SCNC: 105 MMOL/L
CHOLEST SERPL-MCNC: 173 MG/DL
CO2 SERPL-SCNC: 25 MMOL/L
CREAT SERPL-MCNC: 0.55 MG/DL
EGFR: 92 ML/MIN/1.73M2
EOSINOPHIL # BLD AUTO: 0.16 K/UL
EOSINOPHIL NFR BLD AUTO: 2.4 %
GLUCOSE SERPL-MCNC: 96 MG/DL
HCT VFR BLD CALC: 41.1 %
HDLC SERPL-MCNC: 82 MG/DL
HGB BLD-MCNC: 12.9 G/DL
IMM GRANULOCYTES NFR BLD AUTO: 0.2 %
LDLC SERPL CALC-MCNC: 80 MG/DL
LYMPHOCYTES # BLD AUTO: 1.71 K/UL
LYMPHOCYTES NFR BLD AUTO: 25.8 %
MAN DIFF?: NORMAL
MCHC RBC-ENTMCNC: 27.9 PG
MCHC RBC-ENTMCNC: 31.4 GM/DL
MCV RBC AUTO: 88.8 FL
MONOCYTES # BLD AUTO: 0.53 K/UL
MONOCYTES NFR BLD AUTO: 8 %
NEUTROPHILS # BLD AUTO: 4.19 K/UL
NEUTROPHILS NFR BLD AUTO: 63.1 %
NONHDLC SERPL-MCNC: 91 MG/DL
PLATELET # BLD AUTO: 170 K/UL
POTASSIUM SERPL-SCNC: 4.3 MMOL/L
PROT SERPL-MCNC: 6.5 G/DL
RBC # BLD: 4.63 M/UL
RBC # FLD: 14.9 %
SODIUM SERPL-SCNC: 144 MMOL/L
TRIGL SERPL-MCNC: 54 MG/DL
TSH SERPL-ACNC: 0.74 UIU/ML
WBC # FLD AUTO: 6.63 K/UL

## 2023-04-03 ENCOUNTER — RX RENEWAL (OUTPATIENT)
Age: 83
End: 2023-04-03

## 2023-04-17 ENCOUNTER — RX RENEWAL (OUTPATIENT)
Age: 83
End: 2023-04-17

## 2023-04-17 ENCOUNTER — APPOINTMENT (OUTPATIENT)
Dept: INTERNAL MEDICINE | Facility: CLINIC | Age: 83
End: 2023-04-17
Payer: MEDICARE

## 2023-04-17 VITALS
TEMPERATURE: 98.2 F | WEIGHT: 111 LBS | BODY MASS INDEX: 17.84 KG/M2 | HEART RATE: 75 BPM | DIASTOLIC BLOOD PRESSURE: 72 MMHG | SYSTOLIC BLOOD PRESSURE: 128 MMHG | HEIGHT: 66 IN | OXYGEN SATURATION: 97 %

## 2023-04-17 DIAGNOSIS — F41.9 ANXIETY DISORDER, UNSPECIFIED: ICD-10-CM

## 2023-04-17 DIAGNOSIS — M25.511 PAIN IN RIGHT SHOULDER: ICD-10-CM

## 2023-04-17 DIAGNOSIS — Z87.09 PERSONAL HISTORY OF OTHER DISEASES OF THE RESPIRATORY SYSTEM: ICD-10-CM

## 2023-04-17 PROCEDURE — 99214 OFFICE O/P EST MOD 30 MIN: CPT

## 2023-04-17 NOTE — PHYSICAL EXAM
[Normal Sclera/Conjunctiva] : normal sclera/conjunctiva [Normal Outer Ear/Nose] : the outer ears and nose were normal in appearance [No Joint Swelling] : no joint swelling [Grossly Normal Strength/Tone] : grossly normal strength/tone [Normal] : no joint swelling and grossly normal strength and tone [de-identified] : Right shoulder full range of motion negative point tenderness strength 4 to 5 bilaterally

## 2023-04-17 NOTE — ASSESSMENT
[FreeTextEntry1] : Patient is a 82-year-old female with history of coronary artery disease aortic sclerosis, hyperlipidemia, prediabetes, osteopenia, mild carotid artery disease and anxiety who presents for follow-up and complaint of right shoulder pain\par \par \par 1. Right shoulder pain\par most likely impingement versus tendinitis versus adhesive capsulitis\par ice physical therapy and Tylenol for now\par \par 2 coronary disease\par significant coronary disease presently asymptomatic\par aggressive cholesterol-lowering including Pravachol 80 mg and Zetia 10 mg\par next visit check lipid profile\par follow-up with cardiology\par \par 3 anxiety\par doing well and BuSpar 15 mg half tablet as needed\par \par CPE in two months

## 2023-04-17 NOTE — REVIEW OF SYSTEMS
[Joint Pain] : joint pain [Muscle Pain] : muscle pain [Negative] : Psychiatric [Joint Stiffness] : no joint stiffness [Joint Swelling] : no joint swelling [Muscle Weakness] : no muscle weakness [Back Pain] : no back pain

## 2023-04-17 NOTE — HISTORY OF PRESENT ILLNESS
[FreeTextEntry1] : \par \par Follow-up for multiple medical issues including new onset right shoulder pain [de-identified] : The patient is a 82-year-old female with history of coronary artery disease, carotid artery disease, reflux disease, hypercholesterolemia dense breasts, who presents for follow-up patient complains of several week history of right shoulder pain in certain positions reaching or grabbing or lifting heavy objects pain sharp radiating from the shoulder upwards denies numbness fatigue erythema she also denies chest pain shortness of breath distant exertion notes improvement anxiety taking a half a tablet BuSpar sleeping well.

## 2023-04-27 NOTE — PHYSICAL EXAM
[Normal] : soft, non-tender, non-distended, no masses palpated, no HSM and normal bowel sounds Clindamycin Pregnancy And Lactation Text: This medication can be used in pregnancy if certain situations. Clindamycin is also present in breast milk.

## 2023-05-24 ENCOUNTER — RESULT REVIEW (OUTPATIENT)
Age: 83
End: 2023-05-24

## 2023-05-24 DIAGNOSIS — N64.4 MASTODYNIA: ICD-10-CM

## 2023-05-25 ENCOUNTER — RESULT REVIEW (OUTPATIENT)
Age: 83
End: 2023-05-25

## 2023-05-25 ENCOUNTER — APPOINTMENT (OUTPATIENT)
Dept: ULTRASOUND IMAGING | Facility: CLINIC | Age: 83
End: 2023-05-25
Payer: MEDICARE

## 2023-05-25 ENCOUNTER — OUTPATIENT (OUTPATIENT)
Dept: OUTPATIENT SERVICES | Facility: HOSPITAL | Age: 83
LOS: 1 days | End: 2023-05-25
Payer: MEDICARE

## 2023-05-25 ENCOUNTER — APPOINTMENT (OUTPATIENT)
Dept: MAMMOGRAPHY | Facility: CLINIC | Age: 83
End: 2023-05-25
Payer: MEDICARE

## 2023-05-25 DIAGNOSIS — Z98.89 OTHER SPECIFIED POSTPROCEDURAL STATES: Chronic | ICD-10-CM

## 2023-05-25 DIAGNOSIS — N64.4 MASTODYNIA: ICD-10-CM

## 2023-05-25 DIAGNOSIS — K66.0 PERITONEAL ADHESIONS (POSTPROCEDURAL) (POSTINFECTION): Chronic | ICD-10-CM

## 2023-05-25 DIAGNOSIS — Z98.41 CATARACT EXTRACTION STATUS, RIGHT EYE: Chronic | ICD-10-CM

## 2023-05-25 DIAGNOSIS — Z41.9 ENCOUNTER FOR PROCEDURE FOR PURPOSES OTHER THAN REMEDYING HEALTH STATE, UNSPECIFIED: Chronic | ICD-10-CM

## 2023-05-25 DIAGNOSIS — Z90.710 ACQUIRED ABSENCE OF BOTH CERVIX AND UTERUS: Chronic | ICD-10-CM

## 2023-05-25 PROCEDURE — G0279: CPT | Mod: 26

## 2023-05-25 PROCEDURE — 77065 DX MAMMO INCL CAD UNI: CPT | Mod: 26,LT

## 2023-05-25 PROCEDURE — 76641 ULTRASOUND BREAST COMPLETE: CPT

## 2023-05-25 PROCEDURE — 77065 DX MAMMO INCL CAD UNI: CPT

## 2023-05-25 PROCEDURE — 76641 ULTRASOUND BREAST COMPLETE: CPT | Mod: 26,LT,59,GZ

## 2023-05-25 PROCEDURE — G0279: CPT

## 2023-06-12 ENCOUNTER — APPOINTMENT (OUTPATIENT)
Dept: OTOLARYNGOLOGY | Facility: CLINIC | Age: 83
End: 2023-06-12
Payer: MEDICARE

## 2023-06-12 VITALS
HEART RATE: 69 BPM | BODY MASS INDEX: 18.16 KG/M2 | WEIGHT: 113 LBS | HEIGHT: 66 IN | DIASTOLIC BLOOD PRESSURE: 69 MMHG | SYSTOLIC BLOOD PRESSURE: 160 MMHG

## 2023-06-12 DIAGNOSIS — R49.0 DYSPHONIA: ICD-10-CM

## 2023-06-12 PROCEDURE — 31579 LARYNGOSCOPY TELESCOPIC: CPT

## 2023-06-12 PROCEDURE — 99214 OFFICE O/P EST MOD 30 MIN: CPT | Mod: 25

## 2023-06-12 RX ORDER — SALINE NASAL SPRAY 1.5 OZ
0.65 SOLUTION NASAL
Qty: 1 | Refills: 2 | Status: COMPLETED | COMMUNITY
Start: 2023-02-09 | End: 2023-06-12

## 2023-06-12 NOTE — PROCEDURE
[de-identified] : Stroboscopic Laryngoscopy Procedure Note: \par Indication:	Assess laryngeal biomechanics and vocal fold oscillation. \par Description of Procedure:	Informed consent was verbally obtained from the patient prior to the procedure. The patient was seated in the clinic chair. Topical anesthesia was achieved by first spraying the nasal cavities with 4% lidocaine and nasal decongestant. \par \par Findings: \par Supraglottis: no masses or lesions \par Glottis:    Structure: \par                       Right: crisp and shows no lesions or masses \par                       Left:  crisp and shows no lesions or masses \par                Mobility: \par                       Right:  normal \par                       Left:  normal\par                Amplitude: \par                       Right:  normal\par                       Left:  normal\par                Closure: complete \par                Wave symmetry:  could not determine as it would not sync at most frequencies\par Subglottis: no masses or lesions within the visualized subglottis Visualized airway is widely patent.

## 2023-06-12 NOTE — HISTORY OF PRESENT ILLNESS
[de-identified] : AMENA GORDON is a 82 year old woman who presents to the [facility] with difficulty phonating. Thinks it started after a URI.  \par She now does not note periods of normal voicing -  voice has been hoarse for about 2 years \par She does not note specific difficulties with swallowing - occasionally struggles with large pills \par She does note classic heartburn symptoms infrequently - takes Omeprazole PRN, rarely\par \par VOCAL DEMANDS: Her vocal demands are primarily those of general conversation

## 2023-06-12 NOTE — ASSESSMENT
[FreeTextEntry1] : Assessment/Plan: \par #1 Muscle tension dysphonia \par \par I have recommended SLP therapy. The risks, benefits, and alternatives to care were discussed with the patient and understanding expressed. Patient is in agreement.  I would like to see her back once completed therapy, especially since strobe could not  movement for most of today's exam, likely secondary to MTD causing VF to vibrate off phase. \par \par

## 2023-06-28 ENCOUNTER — NON-APPOINTMENT (OUTPATIENT)
Age: 83
End: 2023-06-28

## 2023-06-28 ENCOUNTER — APPOINTMENT (OUTPATIENT)
Dept: CARDIOLOGY | Facility: CLINIC | Age: 83
End: 2023-06-28
Payer: MEDICARE

## 2023-06-28 ENCOUNTER — APPOINTMENT (OUTPATIENT)
Dept: ELECTROPHYSIOLOGY | Facility: CLINIC | Age: 83
End: 2023-06-28
Payer: MEDICARE

## 2023-06-28 VITALS
HEIGHT: 66 IN | DIASTOLIC BLOOD PRESSURE: 86 MMHG | OXYGEN SATURATION: 99 % | HEART RATE: 90 BPM | SYSTOLIC BLOOD PRESSURE: 145 MMHG | WEIGHT: 115 LBS | BODY MASS INDEX: 18.48 KG/M2

## 2023-06-28 PROCEDURE — 93000 ELECTROCARDIOGRAM COMPLETE: CPT

## 2023-06-28 PROCEDURE — 99214 OFFICE O/P EST MOD 30 MIN: CPT

## 2023-06-30 ENCOUNTER — APPOINTMENT (OUTPATIENT)
Dept: OTOLARYNGOLOGY | Facility: CLINIC | Age: 83
End: 2023-06-30
Payer: MEDICARE

## 2023-06-30 PROCEDURE — 92524 BEHAVRAL QUALIT ANALYS VOICE: CPT | Mod: GN

## 2023-06-30 PROCEDURE — 92520 LARYNGEAL FUNCTION STUDIES: CPT | Mod: GN

## 2023-07-06 ENCOUNTER — APPOINTMENT (OUTPATIENT)
Dept: INTERNAL MEDICINE | Facility: CLINIC | Age: 83
End: 2023-07-06
Payer: MEDICARE

## 2023-07-06 ENCOUNTER — LABORATORY RESULT (OUTPATIENT)
Age: 83
End: 2023-07-06

## 2023-07-06 VITALS
TEMPERATURE: 97.6 F | HEIGHT: 66 IN | BODY MASS INDEX: 18.32 KG/M2 | WEIGHT: 114 LBS | HEART RATE: 93 BPM | DIASTOLIC BLOOD PRESSURE: 72 MMHG | SYSTOLIC BLOOD PRESSURE: 117 MMHG | RESPIRATION RATE: 17 BRPM | OXYGEN SATURATION: 96 %

## 2023-07-06 VITALS — SYSTOLIC BLOOD PRESSURE: 114 MMHG | DIASTOLIC BLOOD PRESSURE: 58 MMHG

## 2023-07-06 VITALS — SYSTOLIC BLOOD PRESSURE: 120 MMHG | DIASTOLIC BLOOD PRESSURE: 60 MMHG

## 2023-07-06 DIAGNOSIS — I65.29 OCCLUSION AND STENOSIS OF UNSPECIFIED CAROTID ARTERY: ICD-10-CM

## 2023-07-06 DIAGNOSIS — R35.0 FREQUENCY OF MICTURITION: ICD-10-CM

## 2023-07-06 DIAGNOSIS — Z23 ENCOUNTER FOR IMMUNIZATION: ICD-10-CM

## 2023-07-06 PROCEDURE — 36415 COLL VENOUS BLD VENIPUNCTURE: CPT

## 2023-07-06 PROCEDURE — 99213 OFFICE O/P EST LOW 20 MIN: CPT | Mod: 25

## 2023-07-06 PROCEDURE — G0439: CPT

## 2023-07-11 PROBLEM — R35.0 URINARY FREQUENCY: Status: ACTIVE | Noted: 2023-07-06

## 2023-07-11 PROBLEM — I65.29 CAROTID ATHEROSCLEROSIS, UNSPECIFIED LATERALITY: Status: ACTIVE | Noted: 2018-06-15

## 2023-07-11 PROBLEM — Z23 ENCOUNTER FOR IMMUNIZATION: Status: ACTIVE | Noted: 2020-09-15

## 2023-07-11 PROCEDURE — 93244 EXT ECG>48HR<7D REV&INTERPJ: CPT

## 2023-07-11 NOTE — REASON FOR VISIT
[Annual Wellness Visit] : an annual wellness visit [FreeTextEntry1] : Hence of annual physical examination follow-up of multiple medical issues complaint of stress insomnia

## 2023-07-11 NOTE — HEALTH RISK ASSESSMENT
[Good] : ~his/her~  mood as  good [Yes] : Yes [2 - 4 times a month (2 pts)] : 2-4 times a month (2 points) [1 or 2 (0 pts)] : 1 or 2 (0 points) [Never (0 pts)] : Never (0 points) [No falls in past year] : Patient reported no falls in the past year [0] : 2) Feeling down, depressed, or hopeless: Not at all (0) [PHQ-2 Negative - No further assessment needed] : PHQ-2 Negative - No further assessment needed [HIV test declined] : HIV test declined [Hepatitis C test declined] : Hepatitis C test declined [None] : None [With Family] : lives with family [# of Members in Household ___] :  household currently consist of [unfilled] member(s) [Retired] : retired [] :  [# Of Children ___] : has [unfilled] children [Feels Safe at Home] : Feels safe at home [Fully functional (bathing, dressing, toileting, transferring, walking, feeding)] : Fully functional (bathing, dressing, toileting, transferring, walking, feeding) [Fully functional (using the telephone, shopping, preparing meals, housekeeping, doing laundry, using] : Fully functional and needs no help or supervision to perform IADLs (using the telephone, shopping, preparing meals, housekeeping, doing laundry, using transportation, managing medications and managing finances) [Reports changes in hearing] : Reports changes in hearing [Smoke Detector] : smoke detector [Safety elements used in home] : safety elements used in home [Seat Belt] :  uses seat belt [Sunscreen] : uses sunscreen [Never] : Never [Audit-CScore] : 2 [de-identified] : Walks guardians [de-identified] : Eats healthy [[id number]] : 0 [Change in mental status noted] : No change in mental status noted [Language] : denies difficulty with language [Behavior] : denies difficulty with behavior [Learning/Retaining New Information] : denies difficulty learning/retaining new information [Handling Complex Tasks] : denies difficulty handling complex tasks [Reasoning] : denies difficulty with reasoning [Spatial Ability and Orientation] : denies difficulty with spatial ability and orientation [Reports changes in vision] : Reports no changes in vision [Reports normal functional visual acuity (ie: able to read med bottle)] : Reports poor functional visual acuity.  [Reports changes in dental health] : Reports no changes in dental health [Guns at Home] : no guns at home [Travel to Developing Areas] : does not  travel to developing areas [TB Exposure] : is not being exposed to tuberculosis [Caregiver Concerns] : does not have caregiver concerns

## 2023-07-11 NOTE — ASSESSMENT
[FreeTextEntry1] : The patient is a 82-year-old female with history of coronary artery disease, carotid artery disease, aortic calcification, hyperlipidemia prediabetes mild aortic Gerd, mild mitral regurgitation osteopenia hearing loss, dense breasts, who presents for comprehensive annual physical examination follow-up for multiple medical issues\par \par 1 coronary disease\par continue aggressive cholesterol-lowering\par aspirin 81 mg PO Q day\par \par 2. Carotid artery disease\par mild disease cholesterol-lowering and aspirin 81 mg\par \par 3. Prediabetes\par counseled on diet check A1c\par \par 4 urinary frequency\par rule out infection check UA\par \par 5 osteopenia\par continue calcium vitamin D\par \par 6 bigeminy on EKG\par extended Holter done by cardiology await results\par denies lightheadedness or symptoms\par \par 7 health maintenance\par vaccines up to date\par bone density done\par mammogram done\par check routine labs drawn in office today\par follow-up in three months

## 2023-07-11 NOTE — PHYSICAL EXAM
[Normal Sclera/Conjunctiva] : normal sclera/conjunctiva [Normal Outer Ear/Nose] : the outer ears and nose were normal in appearance [Normal Oropharynx] : the oropharynx was normal [Normal TMs] : both tympanic membranes were normal [Normal Rate] : normal rate  [Regular Rhythm] : with a regular rhythm [Normal S1, S2] : normal S1 and S2 [Normal Appearance] : normal in appearance [No Masses] : no palpable masses [No Nipple Discharge] : no nipple discharge [No Axillary Lymphadenopathy] : no axillary lymphadenopathy [Normal] : affect was normal and insight and judgment were intact [de-identified] : 2/6 systolic murmur

## 2023-07-11 NOTE — REVIEW OF SYSTEMS
[Hearing Loss] : hearing loss [Palpitations] : palpitations [Paroxysmal Nocturnal Dyspnea] : paroxysmal nocturnal dyspnea [Dyspnea on Exertion] : dyspnea on exertion [Frequency] : frequency [Negative] : Heme/Lymph [Earache] : no earache [Nosebleed] : no nosebleeds [Hoarseness] : no hoarseness [Nasal Discharge] : no nasal discharge [Sore Throat] : no sore throat [Postnasal Drip] : no postnasal drip [Chest Pain] : no chest pain [Lower Ext Edema] : no lower extremity edema [Orthopnea] : no orthopnea [Shortness Of Breath] : no shortness of breath [Wheezing] : no wheezing [Cough] : no cough [Dysuria] : no dysuria [Incontinence] : no incontinence [Nocturia] : no nocturia [Poor Libido] : libido not poor [Hematuria] : no hematuria [Vaginal Discharge] : no vaginal discharge [Dysmenorrhea] : no dysmenorrhea

## 2023-07-11 NOTE — HISTORY OF PRESENT ILLNESS
[FreeTextEntry1] : Comprehensive annual physical exam follow-up for multiple medical issues and complaint of stress [de-identified] : The patient is a 82-year-old female with history of coronary artery disease, catheterization 2021, hyperlipidemia, mild mitral regurgitation, osteopenia, prediabetes, hearing loss, glaucoma, vitamin D deficiency who presents for comprehensive annual physical examination. Patient recently saw cardiology EKG showed frequent PVCs pending extended Holter. She denies chest pain shortness of breath but notes some palpitation nations and lightheadedness patient also recently lost her sister to dementia and metastatic cancer hasn't been sleeping well able to fall but not stay asleep notes frequent urination

## 2023-07-13 LAB
APPEARANCE: CLEAR
BILIRUBIN URINE: NEGATIVE
BLOOD URINE: NEGATIVE
CHOLEST SERPL-MCNC: 169 MG/DL
COLOR: YELLOW
GLUCOSE QUALITATIVE U: NEGATIVE MG/DL
HDLC SERPL-MCNC: 79 MG/DL
KETONES URINE: NEGATIVE MG/DL
LDLC SERPL CALC-MCNC: 69 MG/DL
LEUKOCYTE ESTERASE URINE: ABNORMAL
NITRITE URINE: NEGATIVE
NONHDLC SERPL-MCNC: 90 MG/DL
PH URINE: 6.5
PROTEIN URINE: NEGATIVE MG/DL
SPECIFIC GRAVITY URINE: 1.02
TRIGL SERPL-MCNC: 106 MG/DL
TSH SERPL-ACNC: 0.68 UIU/ML
UROBILINOGEN URINE: 0.2 MG/DL

## 2023-07-16 NOTE — DISCUSSION/SUMMARY
[FreeTextEntry1] : Will get event monitor\par Same medication for now [EKG obtained to assist in diagnosis and management of assessed problem(s)] : EKG obtained to assist in diagnosis and management of assessed problem(s)

## 2023-07-16 NOTE — HISTORY OF PRESENT ILLNESS
[FreeTextEntry1] : Ms. Brown presents today.\par She notes she lost her sister this AM\par She notes periods of time she feels her heart racing. She notes this at night. \par She can also get dizziy during these episodes\par She gets dizzy when she bends over.\par No chest pain. No SOB except with significant exertion. \par She just does not feel well. \par \par She falls asleep but wakes often. \par She stays hydrated. \par Appetite is ok.

## 2023-07-16 NOTE — PHYSICAL EXAM
[Well Developed] : well developed [Well Nourished] : well nourished [No Acute Distress] : no acute distress [Normal Conjunctiva] : normal conjunctiva [Normal Venous Pressure] : normal venous pressure [No Carotid Bruit] : no carotid bruit [Normal S1, S2] : normal S1, S2 [No Murmur] : no murmur [No Rub] : no rub [No Gallop] : no gallop [Clear Lung Fields] : clear lung fields [Good Air Entry] : good air entry [No Respiratory Distress] : no respiratory distress  [Soft] : abdomen soft [Non Tender] : non-tender [No Masses/organomegaly] : no masses/organomegaly [Normal Bowel Sounds] : normal bowel sounds [Normal Gait] : normal gait [No Edema] : no edema [No Cyanosis] : no cyanosis [No Clubbing] : no clubbing [No Varicosities] : no varicosities [No Rash] : no rash [No Skin Lesions] : no skin lesions [Moves all extremities] : moves all extremities [No Focal Deficits] : no focal deficits [Normal Speech] : normal speech [Alert and Oriented] : alert and oriented [Normal memory] : normal memory [de-identified] : Ir IR about 1 in 7 beats is PVC.

## 2023-07-17 ENCOUNTER — NON-APPOINTMENT (OUTPATIENT)
Age: 83
End: 2023-07-17

## 2023-07-20 ENCOUNTER — APPOINTMENT (OUTPATIENT)
Dept: OTOLARYNGOLOGY | Facility: CLINIC | Age: 83
End: 2023-07-20
Payer: MEDICARE

## 2023-07-20 PROCEDURE — 92507 TX SP LANG VOICE COMM INDIV: CPT | Mod: GN

## 2023-07-24 ENCOUNTER — LABORATORY RESULT (OUTPATIENT)
Age: 83
End: 2023-07-24

## 2023-07-24 ENCOUNTER — APPOINTMENT (OUTPATIENT)
Dept: ELECTROPHYSIOLOGY | Facility: CLINIC | Age: 83
End: 2023-07-24
Payer: MEDICARE

## 2023-07-24 ENCOUNTER — NON-APPOINTMENT (OUTPATIENT)
Age: 83
End: 2023-07-24

## 2023-07-24 VITALS
OXYGEN SATURATION: 98 % | HEART RATE: 58 BPM | HEIGHT: 66 IN | WEIGHT: 116 LBS | SYSTOLIC BLOOD PRESSURE: 137 MMHG | BODY MASS INDEX: 18.64 KG/M2 | DIASTOLIC BLOOD PRESSURE: 64 MMHG

## 2023-07-24 PROCEDURE — 93000 ELECTROCARDIOGRAM COMPLETE: CPT

## 2023-07-24 PROCEDURE — 99213 OFFICE O/P EST LOW 20 MIN: CPT

## 2023-07-24 RX ORDER — TRETINOIN 0.25 MG/G
0.03 CREAM TOPICAL
Qty: 1 | Refills: 6 | Status: DISCONTINUED | COMMUNITY
Start: 2022-03-01 | End: 2023-07-24

## 2023-07-24 RX ORDER — NITROGLYCERIN 0.4 MG/1
0.4 TABLET SUBLINGUAL
Qty: 25 | Refills: 0 | Status: DISCONTINUED | COMMUNITY
Start: 2022-10-04 | End: 2023-07-24

## 2023-07-24 RX ORDER — MULTIVITAMIN
TABLET ORAL DAILY
Refills: 0 | Status: DISCONTINUED | COMMUNITY
Start: 2021-03-25 | End: 2023-07-24

## 2023-07-24 RX ORDER — BRIMONIDINE TARTRATE 1 MG/ML
0.1 SOLUTION/ DROPS OPHTHALMIC
Qty: 20 | Refills: 0 | Status: DISCONTINUED | COMMUNITY
Start: 2022-09-19

## 2023-07-24 RX ORDER — BRIMONIDINE TARTRATE 1 MG/ML
0.1 SOLUTION/ DROPS OPHTHALMIC
Qty: 20 | Refills: 0 | Status: ACTIVE | COMMUNITY
Start: 2022-09-19

## 2023-07-24 RX ORDER — TIMOLOL MALEATE 5 MG/ML
0.5 SOLUTION OPHTHALMIC
Refills: 0 | Status: ACTIVE | COMMUNITY
Start: 2023-07-24

## 2023-07-26 ENCOUNTER — APPOINTMENT (OUTPATIENT)
Dept: OTOLARYNGOLOGY | Facility: CLINIC | Age: 83
End: 2023-07-26
Payer: MEDICARE

## 2023-07-26 PROCEDURE — 92507 TX SP LANG VOICE COMM INDIV: CPT | Mod: GN

## 2023-07-28 NOTE — REASON FOR VISIT
[Symptom and Test Evaluation] : symptom and test evaluation [FreeTextEntry1] : I had the pleasure of seeing your patient Michelle milligan today in the arrhythmia clinic of [facility]. As you well know the patient is a delightful and anxious 82-year-old female with history of coronary artery disease, carotid artery disease, reflux disease, hypercholesterolemia who has recently been experiencing periods of dizziness, fatigue, weight loss and palpitations. She reports postural dizziness more significant when she tilts her head back. SHe describes things as spinning.  Her palpitations mainly happen at night although she occasionally feels heart pounding throughout the day. She was noted to have frequent PVCs on ECG by her cardiologist who ordered an event monitor revealing a greater than 20% PVC burden.\par \par  As far as her CAD status the patient had undergone a CTA/FFR which suggested a 50-55% lesion in D1. This was felt to be a small vessel and would be managed medically. Her echocardiogram on October 10, 2022 demonstrated bileaflet mitral valve prolapse with mild mitral regurgitation.  There was mild AI.  Her left atrium was 3.1 cm.  There was normal left and right ventricular function with an ejection fraction of 44%.  Of note the patient has not tolerated beta-blockers whatsoever.\par \par Today in clinic her vital signs are HR 58 /64. Her ECG reveals bigeminy with PVCs with a left bundle inferior axis morphology and an abrupt transition between V2 and V3.  They may be coming from the RVOT however the abrupt transition between V2 and V3 suggests they may have an origin in the coronary cusp.  Rule out a cusp origin.

## 2023-07-28 NOTE — DISCUSSION/SUMMARY
[FreeTextEntry1] : In summary Michelle Brown is a delightful but anxious  82-year-old female with history of coronary artery disease, carotid artery disease, reflux disease, hypercholesterolemia who has recently been experiencing periods of dizziness, fatigue, weight loss and palpitations. She was noted to have frequent PVCs on ECG by her cardiologist who ordered an event monitor revealing a greater than 20% PVC burden for which she was referred to our clinic for assessment. \par \par Her symptoms of dizziness are likely non cardiac in origin and sound vertiginous or related to inner ear pathology. She was encouraged to follow up with ENT. Her symptoms of weight loss are also less likely to be arrhythmic and she was encouraged to follow up with her primary care physician. Regarding her palpitations and to a lesser degree her fatigue, these may be related to her PVCs.  In addition to symptoms a 20% PVC burden is concerning for the potential development of a cardiomyopathy.  Management options including antiarrhythmic therapy and ablation were discussed as well as their risks and benefits.  As noted the patient does not tolerate medications well.1 option would be a trial of amiodarone to see if it 1, would suppress the PVCs sees and to if it does suppress the PVCs whether it has any impact on her symptomatology.  The patient is opting for a trial of amiodarone (400mg for 4 weeks followed by 200mg daily and ultimately). She will follow up with us in 1 month for assessment of symptoms and arrhythmia burden at which point we will assess the utility of further interventions.  If the patient feels better and there is a reduction in her PVCs on amiodarone consideration could be for discontinuing it and performing ablation.  The patient however is very reluctant to undergo a procedure as she has had complications with other procedures in the past.\par \par I, Dr. Hebert personally performed the evaluation and management (E/M) services for this new patient. That E/M includes conducting the initial examination, assessing all conditions, and establishing the plan of care. Today, my fellow Jon Bingham was here to observe my evaluation and management services for this patient to be followed going forward. [EKG obtained to assist in diagnosis and management of assessed problem(s)] : EKG obtained to assist in diagnosis and management of assessed problem(s)

## 2023-07-31 ENCOUNTER — APPOINTMENT (OUTPATIENT)
Dept: OTOLARYNGOLOGY | Facility: CLINIC | Age: 83
End: 2023-07-31
Payer: MEDICARE

## 2023-07-31 PROCEDURE — 92507 TX SP LANG VOICE COMM INDIV: CPT | Mod: GN

## 2023-08-11 ENCOUNTER — APPOINTMENT (OUTPATIENT)
Dept: OTOLARYNGOLOGY | Facility: CLINIC | Age: 83
End: 2023-08-11
Payer: MEDICARE

## 2023-08-11 PROCEDURE — 92507 TX SP LANG VOICE COMM INDIV: CPT | Mod: GN

## 2023-08-31 ENCOUNTER — RX RENEWAL (OUTPATIENT)
Age: 83
End: 2023-08-31

## 2023-09-01 ENCOUNTER — APPOINTMENT (OUTPATIENT)
Dept: OTOLARYNGOLOGY | Facility: CLINIC | Age: 83
End: 2023-09-01
Payer: MEDICARE

## 2023-09-01 PROCEDURE — 92507 TX SP LANG VOICE COMM INDIV: CPT | Mod: GN

## 2023-09-05 ENCOUNTER — RX RENEWAL (OUTPATIENT)
Age: 83
End: 2023-09-05

## 2023-09-06 ENCOUNTER — APPOINTMENT (OUTPATIENT)
Dept: OTOLARYNGOLOGY | Facility: CLINIC | Age: 83
End: 2023-09-06

## 2023-09-25 ENCOUNTER — APPOINTMENT (OUTPATIENT)
Dept: ELECTROPHYSIOLOGY | Facility: CLINIC | Age: 83
End: 2023-09-25
Payer: MEDICARE

## 2023-09-25 VITALS — OXYGEN SATURATION: 100 % | SYSTOLIC BLOOD PRESSURE: 153 MMHG | DIASTOLIC BLOOD PRESSURE: 79 MMHG | HEART RATE: 62 BPM

## 2023-09-25 DIAGNOSIS — I49.8 OTHER SPECIFIED CARDIAC ARRHYTHMIAS: ICD-10-CM

## 2023-09-25 PROCEDURE — 99214 OFFICE O/P EST MOD 30 MIN: CPT

## 2023-09-25 PROCEDURE — 93000 ELECTROCARDIOGRAM COMPLETE: CPT

## 2023-09-25 RX ORDER — B-COMPLEX WITH VITAMIN C
TABLET ORAL
Refills: 0 | Status: ACTIVE | COMMUNITY

## 2023-09-25 RX ORDER — KETOCONAZOLE 20 MG/G
2 CREAM TOPICAL
Qty: 1 | Refills: 2 | Status: DISCONTINUED | COMMUNITY
Start: 2023-01-10 | End: 2023-09-25

## 2023-09-25 RX ORDER — MELATONIN 3 MG
TABLET ORAL
Refills: 0 | Status: ACTIVE | COMMUNITY

## 2023-09-25 RX ORDER — POLYETHYLENE GLYCOL 400 AND PROPYLENE GLYCOL 4; 3 MG/ML; MG/ML
0.4-0.3 SOLUTION/ DROPS OPHTHALMIC
Refills: 0 | Status: ACTIVE | COMMUNITY

## 2023-09-25 RX ORDER — AMIODARONE HYDROCHLORIDE 200 MG/1
200 TABLET ORAL DAILY
Qty: 30 | Refills: 6 | Status: ACTIVE | COMMUNITY
Start: 2023-07-24 | End: 1900-01-01

## 2023-09-25 RX ORDER — OMEPRAZOLE 40 MG/1
40 CAPSULE, DELAYED RELEASE ORAL
Qty: 30 | Refills: 2 | Status: DISCONTINUED | COMMUNITY
Start: 2022-10-20 | End: 2023-09-25

## 2023-09-29 ENCOUNTER — APPOINTMENT (OUTPATIENT)
Dept: INTERNAL MEDICINE | Facility: CLINIC | Age: 83
End: 2023-09-29

## 2023-09-29 ENCOUNTER — APPOINTMENT (OUTPATIENT)
Dept: INTERNAL MEDICINE | Facility: CLINIC | Age: 83
End: 2023-09-29
Payer: MEDICARE

## 2023-09-29 PROCEDURE — 90662 IIV NO PRSV INCREASED AG IM: CPT

## 2023-09-29 PROCEDURE — G0008: CPT

## 2023-10-11 ENCOUNTER — NON-APPOINTMENT (OUTPATIENT)
Age: 83
End: 2023-10-11

## 2023-10-11 RX ORDER — PRAVASTATIN SODIUM 80 MG/1
80 TABLET ORAL
Qty: 90 | Refills: 2 | Status: ACTIVE | COMMUNITY
Start: 2021-06-23 | End: 1900-01-01

## 2023-10-13 ENCOUNTER — RX RENEWAL (OUTPATIENT)
Age: 83
End: 2023-10-13

## 2023-10-18 PROCEDURE — 93244 EXT ECG>48HR<7D REV&INTERPJ: CPT

## 2023-11-05 ENCOUNTER — RX RENEWAL (OUTPATIENT)
Age: 83
End: 2023-11-05

## 2023-11-28 ENCOUNTER — APPOINTMENT (OUTPATIENT)
Dept: DERMATOLOGY | Facility: CLINIC | Age: 83
End: 2023-11-28
Payer: MEDICARE

## 2023-11-28 DIAGNOSIS — L57.0 ACTINIC KERATOSIS: ICD-10-CM

## 2023-11-28 DIAGNOSIS — Z12.83 ENCOUNTER FOR SCREENING FOR MALIGNANT NEOPLASM OF SKIN: ICD-10-CM

## 2023-11-28 DIAGNOSIS — D48.5 NEOPLASM OF UNCERTAIN BEHAVIOR OF SKIN: ICD-10-CM

## 2023-11-28 DIAGNOSIS — R23.8 OTHER SKIN CHANGES: ICD-10-CM

## 2023-11-28 DIAGNOSIS — D22.9 MELANOCYTIC NEVI, UNSPECIFIED: ICD-10-CM

## 2023-11-28 PROCEDURE — 99213 OFFICE O/P EST LOW 20 MIN: CPT | Mod: 25

## 2023-11-28 PROCEDURE — 17000 DESTRUCT PREMALG LESION: CPT | Mod: 59

## 2023-11-28 PROCEDURE — 17003 DESTRUCT PREMALG LES 2-14: CPT | Mod: 59

## 2023-11-28 PROCEDURE — 11102 TANGNTL BX SKIN SINGLE LES: CPT | Mod: 59

## 2023-11-29 DIAGNOSIS — R92.2 INCONCLUSIVE MAMMOGRAM: ICD-10-CM

## 2023-11-29 DIAGNOSIS — R92.30 INCONCLUSIVE MAMMOGRAM: ICD-10-CM

## 2023-11-29 RX ORDER — EZETIMIBE 10 MG/1
10 TABLET ORAL
Qty: 90 | Refills: 3 | Status: ACTIVE | COMMUNITY
Start: 2021-03-24 | End: 1900-01-01

## 2023-12-11 ENCOUNTER — NON-APPOINTMENT (OUTPATIENT)
Age: 83
End: 2023-12-11

## 2023-12-13 ENCOUNTER — APPOINTMENT (OUTPATIENT)
Dept: DERMATOLOGY | Facility: CLINIC | Age: 83
End: 2023-12-13
Payer: MEDICARE

## 2023-12-13 DIAGNOSIS — L82.1 OTHER SEBORRHEIC KERATOSIS: ICD-10-CM

## 2023-12-13 DIAGNOSIS — C44.612 BASAL CELL CARCINOMA OF SKIN OF RIGHT UPPER LIMB, INCLUDING SHOULDER: ICD-10-CM

## 2023-12-13 PROCEDURE — 99213 OFFICE O/P EST LOW 20 MIN: CPT | Mod: 25

## 2023-12-13 PROCEDURE — 11602 EXC TR-EXT MAL+MARG 1.1-2 CM: CPT

## 2023-12-13 PROCEDURE — 12032 INTMD RPR S/A/T/EXT 2.6-7.5: CPT

## 2023-12-14 LAB — DERMATOLOGY BIOPSY: NORMAL

## 2023-12-19 LAB — DERMATOLOGY BIOPSY: NORMAL

## 2023-12-30 NOTE — ASSESSMENT
[FreeTextEntry1] : Patient is a 79-year-old female with history of colonic polyps, hypercholesterolemia borderline low vitamin D mitral/aortic regurgitation, osteopenia, prediabetes who presents for follow-up and complaint of weight loss\par \par 1 weight loss\par according to records over the last year only lost about 5 pounds three years ago 10 pounds heavier\par will check labs including thyroid CBC and observe may be secondary to stress if persist will consider further workup\par follow-up in six weeks encourage oral intake\par \par 2. History of Gerd\par patient is seen omeprazole asymptomatic present time\par \par 3 hypercholesterolemia\par borderline numbers will check carotid arteries to see if any change in disease, if so will consider starting statin\par observe\par \par 4 prediabetes\par asymptomatic borderline numbers counseled on diet\par \par 5. Anxiety\par slightly improved improved sleeping will observe [Well Developed] : well developed [Well Nourished] : well nourished [No Acute Distress] : no acute distress [Normal Venous Pressure] : normal venous pressure [No Carotid Bruit] : no carotid bruit [Normal S1, S2] : normal S1, S2 [No Murmur] : no murmur [No Rub] : no rub [No Gallop] : no gallop [Clear Lung Fields] : clear lung fields [Good Air Entry] : good air entry [No Respiratory Distress] : no respiratory distress  [Soft] : abdomen soft [Non Tender] : non-tender [No Masses/organomegaly] : no masses/organomegaly [Normal Bowel Sounds] : normal bowel sounds [Normal Gait] : normal gait [No Edema] : no edema [No Cyanosis] : no cyanosis [No Clubbing] : no clubbing [No Varicosities] : no varicosities [No Rash] : no rash [No Skin Lesions] : no skin lesions [Moves all extremities] : moves all extremities [No Focal Deficits] : no focal deficits [Normal Speech] : normal speech [Alert and Oriented] : alert and oriented [Normal memory] : normal memory [de-identified] : Aníbal davison

## 2024-01-03 ENCOUNTER — APPOINTMENT (OUTPATIENT)
Dept: CARDIOLOGY | Facility: CLINIC | Age: 84
End: 2024-01-03
Payer: MEDICARE

## 2024-01-03 VITALS — DIASTOLIC BLOOD PRESSURE: 71 MMHG | OXYGEN SATURATION: 100 % | HEART RATE: 63 BPM | SYSTOLIC BLOOD PRESSURE: 134 MMHG

## 2024-01-03 PROCEDURE — 99214 OFFICE O/P EST MOD 30 MIN: CPT

## 2024-01-03 PROCEDURE — 93000 ELECTROCARDIOGRAM COMPLETE: CPT

## 2024-01-18 DIAGNOSIS — J06.9 ACUTE UPPER RESPIRATORY INFECTION, UNSPECIFIED: ICD-10-CM

## 2024-01-18 DIAGNOSIS — U07.1 COVID-19: ICD-10-CM

## 2024-01-18 LAB
ALBUMIN SERPL ELPH-MCNC: 4.7 G/DL
ALP BLD-CCNC: 68 U/L
ALT SERPL-CCNC: 26 U/L
ANION GAP SERPL CALC-SCNC: 11 MMOL/L
AST SERPL-CCNC: 30 U/L
BILIRUB SERPL-MCNC: 0.4 MG/DL
BUN SERPL-MCNC: 22 MG/DL
CALCIUM SERPL-MCNC: 9.7 MG/DL
CHLORIDE SERPL-SCNC: 104 MMOL/L
CO2 SERPL-SCNC: 26 MMOL/L
CREAT SERPL-MCNC: 0.81 MG/DL
EGFR: 72 ML/MIN/1.73M2
GLUCOSE SERPL-MCNC: 103 MG/DL
POTASSIUM SERPL-SCNC: 4.5 MMOL/L
PROT SERPL-MCNC: 6.6 G/DL
SODIUM SERPL-SCNC: 141 MMOL/L
TSH SERPL-ACNC: 0.8 UIU/ML

## 2024-01-25 LAB
RAPID RVP RESULT: DETECTED
SARS-COV-2 RNA PNL RESP NAA+PROBE: DETECTED

## 2024-01-30 ENCOUNTER — RESULT REVIEW (OUTPATIENT)
Age: 84
End: 2024-01-30

## 2024-02-05 ENCOUNTER — APPOINTMENT (OUTPATIENT)
Dept: NEUROLOGY | Facility: CLINIC | Age: 84
End: 2024-02-05
Payer: MEDICARE

## 2024-02-05 PROCEDURE — 96133 NRPSYC TST EVAL PHYS/QHP EA: CPT

## 2024-02-05 PROCEDURE — 96132 NRPSYC TST EVAL PHYS/QHP 1ST: CPT

## 2024-02-05 PROCEDURE — 96139 PSYCL/NRPSYC TST TECH EA: CPT | Mod: NC

## 2024-02-05 PROCEDURE — 96138 PSYCL/NRPSYC TECH 1ST: CPT | Mod: NC

## 2024-02-05 PROCEDURE — 96116 NUBHVL XM PHYS/QHP 1ST HR: CPT

## 2024-02-07 ENCOUNTER — APPOINTMENT (OUTPATIENT)
Dept: MAMMOGRAPHY | Facility: CLINIC | Age: 84
End: 2024-02-07
Payer: MEDICARE

## 2024-02-07 ENCOUNTER — RESULT REVIEW (OUTPATIENT)
Age: 84
End: 2024-02-07

## 2024-02-07 ENCOUNTER — APPOINTMENT (OUTPATIENT)
Dept: ULTRASOUND IMAGING | Facility: CLINIC | Age: 84
End: 2024-02-07
Payer: MEDICARE

## 2024-02-07 ENCOUNTER — OUTPATIENT (OUTPATIENT)
Dept: OUTPATIENT SERVICES | Facility: HOSPITAL | Age: 84
LOS: 1 days | End: 2024-02-07
Payer: MEDICARE

## 2024-02-07 DIAGNOSIS — Z98.89 OTHER SPECIFIED POSTPROCEDURAL STATES: Chronic | ICD-10-CM

## 2024-02-07 DIAGNOSIS — Z00.00 ENCOUNTER FOR GENERAL ADULT MEDICAL EXAMINATION WITHOUT ABNORMAL FINDINGS: ICD-10-CM

## 2024-02-07 DIAGNOSIS — K66.0 PERITONEAL ADHESIONS (POSTPROCEDURAL) (POSTINFECTION): Chronic | ICD-10-CM

## 2024-02-07 DIAGNOSIS — R92.30 DENSE BREASTS, UNSPECIFIED: ICD-10-CM

## 2024-02-07 DIAGNOSIS — Z41.9 ENCOUNTER FOR PROCEDURE FOR PURPOSES OTHER THAN REMEDYING HEALTH STATE, UNSPECIFIED: Chronic | ICD-10-CM

## 2024-02-07 DIAGNOSIS — Z98.41 CATARACT EXTRACTION STATUS, RIGHT EYE: Chronic | ICD-10-CM

## 2024-02-07 DIAGNOSIS — Z90.710 ACQUIRED ABSENCE OF BOTH CERVIX AND UTERUS: Chronic | ICD-10-CM

## 2024-02-07 PROCEDURE — 76641 ULTRASOUND BREAST COMPLETE: CPT | Mod: 26,50,3G

## 2024-02-07 PROCEDURE — G0279: CPT | Mod: 26

## 2024-02-07 PROCEDURE — G0279: CPT

## 2024-02-07 PROCEDURE — 77066 DX MAMMO INCL CAD BI: CPT

## 2024-02-07 PROCEDURE — 77066 DX MAMMO INCL CAD BI: CPT | Mod: 26

## 2024-02-07 PROCEDURE — 76641 ULTRASOUND BREAST COMPLETE: CPT

## 2024-02-13 NOTE — DISCUSSION/SUMMARY
[FreeTextEntry1] : PVC suppressed with Amiodarone Same medications for now. Check labs given amio.  [EKG obtained to assist in diagnosis and management of assessed problem(s)] : EKG obtained to assist in diagnosis and management of assessed problem(s)

## 2024-02-13 NOTE — PHYSICAL EXAM
[Well Developed] : well developed [Well Nourished] : well nourished [No Acute Distress] : no acute distress [Normal Conjunctiva] : normal conjunctiva [Normal Venous Pressure] : normal venous pressure [No Carotid Bruit] : no carotid bruit [Normal S1, S2] : normal S1, S2 [No Murmur] : no murmur [No Rub] : no rub [No Gallop] : no gallop [Clear Lung Fields] : clear lung fields [Good Air Entry] : good air entry [No Respiratory Distress] : no respiratory distress  [Soft] : abdomen soft [Non Tender] : non-tender [No Masses/organomegaly] : no masses/organomegaly [Normal Bowel Sounds] : normal bowel sounds [Normal Gait] : normal gait [No Edema] : no edema [No Cyanosis] : no cyanosis [No Clubbing] : no clubbing [No Varicosities] : no varicosities [No Rash] : no rash [No Skin Lesions] : no skin lesions [Moves all extremities] : moves all extremities [No Focal Deficits] : no focal deficits [Normal Speech] : normal speech [Alert and Oriented] : alert and oriented [Normal memory] : normal memory [de-identified] : Ir IR about 1 in 7 beats is PVC.

## 2024-02-21 ENCOUNTER — APPOINTMENT (OUTPATIENT)
Dept: NEUROLOGY | Facility: CLINIC | Age: 84
End: 2024-02-21
Payer: MEDICARE

## 2024-02-21 PROCEDURE — 96133 NRPSYC TST EVAL PHYS/QHP EA: CPT

## 2024-02-26 ENCOUNTER — APPOINTMENT (OUTPATIENT)
Dept: DERMATOLOGY | Facility: CLINIC | Age: 84
End: 2024-02-26

## 2024-02-28 ENCOUNTER — APPOINTMENT (OUTPATIENT)
Dept: NEUROLOGY | Facility: CLINIC | Age: 84
End: 2024-02-28
Payer: MEDICARE

## 2024-02-28 PROCEDURE — 90791 PSYCH DIAGNOSTIC EVALUATION: CPT

## 2024-03-18 NOTE — SOCIAL HISTORY
[FreeTextEntry1] : Pt. born and raised in [place].  She is the oldest of 5 children and remains somewhat close to her siblings.  Pt. reported a good childhood but noted a desire from a young age to leave [place].  She came to [place] and immediately began working.  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  Pt met her  at a cooking class and has been  for over 40 years.  She reported having a great marriage and a generally happy life.  Pt. great passion for horticulture noting that she continues to spend significant time managing the grounds of her home.  She also reported great shanthi from tennis and [employer].  Pt. noted struggle since the pandemic to manage sense of isolation.  She has spent the past 4 years being the primary caregiver for her  who is now 104.  Pt. noted that while her  is cognitively intact, he is medically fragile and she finds herself constantly worrying about his impending death.  She noted recent struggle with sleep and episodes of panic.

## 2024-03-18 NOTE — REASON FOR VISIT
[Consultation for psychological evaluation] : Consultation for psychological evaluation [Patient] : Patient [FreeTextEntry1] : Pt. is an 84 yo female who saw Dr. Vitale for neuropsych testing as a result of cognitive complaints.  Pt. reported worsening of memory with particular difficulty with word retrieval and memory for details/names and distractibility.  Results of the exam suggest Mild cognitive impairment with multifactorial etiology.  Most notably, pt. reported significant stress/anxiety over the past 4 year with social isolation and sleep difficulties.  As such she was referred to me for psychological assessment and CBT oriented psychotherapy.

## 2024-03-18 NOTE — DISCUSSION/SUMMARY
[FreeTextEntry8] : Pt. is an 84 yo female with recent cognitive complaints likely in the context of worsening caregiver stress and social isolation.  More specifically, pt. with word retrieval and executive difficulty in conjunction with severe anxiety and panic, sleep difficulties and overall struggle to prioritize self-care.  While she has been under the care of her primary care doctor who prescribed an buspiron - it was discovered that pt. thought she was to take the medication as needed (as a result she was likely not getting any benefit from the medication).  She presented on the recommendation of neuropsychologist, Dr. Vitale for CBT and supportive psychotherapy.  Pt. viewed to be a good candidate for treatment (see recommendations below).   [FreeTextEntry4] : Pt. is viewed to be an appropriate candidate for CBT oriented psychotherapy.  Treatment will focus on the following: - Psychoeducation around the importance of self-care for caregivers. -Psychoeducation on importance of requesting and accepting help from others when providing care. - Psychoeducation on importance of establishing boundaries to preserve well-being. - Utilization of relaxation techniques to help manage anxiety. - Development of further coping skills to manage various daily stressors. - Use of goal setting/problem solving framework to help pt. re-connect to hobbies and formerly pleasurable activities (with emphasis on social activities).   Given pt's high level of anxiety with panic and reported long-standing sleep difficulties, it is also recommended that pt. consider psychiatric consultation.  Pt. was given Dr. Magda Cortes's contact information for referral.

## 2024-03-18 NOTE — PAST MEDICAL HISTORY
[FreeTextEntry1] : Pt. under the care of her primary care doctor who was concerned about pt's weight loss, hoarse voice, cognitive complaints and anxiety.  He started pt. on Buspiron and referred pt. for neuropsych testing.  Of note, it was discovered during the neuropsych interview that pt. was taking her medication as needed (as she thought she was directed to do) as opposed to twice daily.  As such, pt. was likely not experiencing any benefit from the medication.  Pt. denies any significant psychiatric history (denies past diagnosis, denies past hospitalization, denies past substance abuse and stella past or current suicidality).

## 2024-03-18 NOTE — PHYSICAL EXAM
[Cooperative] : cooperative [Euthymic] : euthymic [Full] : full [Linear/Goal Directed] : linear/goal directed [Clear] : clear [Average] : average [WNL] : within normal limits [de-identified] : Pt with hoarse voice and noted that this started about a year ago and coincided with significant weight loss and worsening of cognitive symptoms.

## 2024-03-18 NOTE — HISTORY OF PRESENT ILLNESS
[FreeTextEntry1] : Pt. is an 83 year old female who is experiencing cognitive difficulties likely in the context of caregiver burden, high level of stress and anxiety, sleep difficulties and social isolation.  Pt. reported that her symptoms began over the past 4 years shortly after her  retired and she became his primary caregiver.  This time also corresponded with the COVID 19 pandemic which left pt. completely isolated as she was caring for her , managing the household and managing various other business affairs.  Pt. noted shift away from hobbies and social connections out of fear of felipe COVID and endangering her .  Despite society's loosening of retrictions in the past 1-2 years, pt. continues to uphold fairly strict rules for herself given her 's age and frailty.  As such, pt. has become quite isolated and does not have ready access to comforts that could help her cope with the stresses of caregiving.  She noticed cognitive changes in conjunction with increase in anxiety and occasional panic attacks.  Of note, she also reports significant unintentional weight loss (went from size 10 to size 2).  Pt. has been treated by her primary care doctor who referred her for neuropsych testing.  Testing suggested mild cognitve impairment with mixed etiology.  She was then referred to me for CBT oriented psychotherapy.  Pt. presents to intake with eagerness to improve emotional and cognitive state.

## 2024-03-20 ENCOUNTER — APPOINTMENT (OUTPATIENT)
Dept: DERMATOLOGY | Facility: CLINIC | Age: 84
End: 2024-03-20

## 2024-03-20 ENCOUNTER — APPOINTMENT (OUTPATIENT)
Dept: NEUROLOGY | Facility: CLINIC | Age: 84
End: 2024-03-20
Payer: MEDICARE

## 2024-03-20 DIAGNOSIS — F43.22 ADJUSTMENT DISORDER WITH ANXIETY: ICD-10-CM

## 2024-03-20 DIAGNOSIS — G31.84 MILD COGNITIVE IMPAIRMENT, SO STATED: ICD-10-CM

## 2024-03-20 PROCEDURE — 90834 PSYTX W PT 45 MINUTES: CPT

## 2024-03-21 PROBLEM — F43.22 ACUTE ADJUSTMENT DISORDER WITH ANXIETY: Status: ACTIVE | Noted: 2024-03-18

## 2024-03-21 PROBLEM — G31.84 MILD COGNITIVE IMPAIRMENT: Status: ACTIVE | Noted: 2024-03-18

## 2024-03-21 NOTE — PLAN
[FreeTextEntry2] : Pt. is viewed to be an appropriate candidate for CBT oriented psychotherapy. Treatment will focus on the following: - Psychoeducation around the importance of self-care for caregivers. -Psychoeducation on importance of requesting and accepting help from others when providing care. - Psychoeducation on importance of establishing boundaries to preserve well-being. - Utilization of relaxation techniques to help manage anxiety. - Development of further coping skills to manage various daily stressors. - Use of goal setting/problem solving framework to help pt. re-connect to hobbies and formerly pleasurable activities (with emphasis on social activities).  Given pt's high level of anxiety with panic and reported long-standing sleep difficulties, it is also recommended that pt. consider psychiatric consultation.  [Cognitive and/or Behavior Therapy] : Cognitive and/or Behavior Therapy  [Psychoeducation] : Psychoeducation  [de-identified] : Pt. arrived to session on time, mental status WNL.  Continued to obtain relevant psychosocial information and to establish rapport.  Discussed pt's experience of psychiatric phone consultation.  Pt. reported feeling of being dismissed by original referral (Dr. Cortes) but was provided with Dr. Stanley's contact information.  I also provided pt. with Dr. Selene Araya's contact information.  Pt. stated intention to contact both providers and schedule with whomever she felt more comfortable with.  Pt. reported some improvement in anxiety with more help at home; however, she continues to struggle with sleeplessness and feeling of isolation.  Pt. also with constant worries about her , house and future.  Validated pt's feelings and empathized with her struggle.  Introduced mindfulness and provided pt. with mindfulness pdf to review at home.   [Supportive Therapy] : Supportive Therapy [FreeTextEntry1] : Pt. to engage in short-term CBT and Support oriented psychotherapy. Pt. to also see a psychiatrist for pharmacological managment of anxiety. Pt. to remain in contact with Neuropsych team for possible re-evaluation in the future.

## 2024-03-21 NOTE — REASON FOR VISIT
[FreeTextEntry1] : Pt. is an 84 yo female who saw Dr. Vitale for neuropsych testing as a result of cognitive complaints. Pt. reported worsening of memory with particular difficulty with word retrieval and memory for details/names and distractibility. Results of the exam suggest Mild cognitive impairment with multifactorial etiology. Most notably, pt. reported significant stress/anxiety over the past 4 year with social isolation and sleep difficulties. As such she was referred to me for psychological assessment and CBT oriented psychotherapy.  Pt. presents for first psychotherapy session after intake.

## 2024-03-22 RX ORDER — NIRMATRELVIR AND RITONAVIR 300-100 MG
20 X 150 MG & KIT ORAL
Qty: 1 | Refills: 0 | Status: DISCONTINUED | COMMUNITY
Start: 2024-01-18 | End: 2024-03-22

## 2024-03-25 ENCOUNTER — APPOINTMENT (OUTPATIENT)
Dept: ELECTROPHYSIOLOGY | Facility: CLINIC | Age: 84
End: 2024-03-25
Payer: MEDICARE

## 2024-03-25 ENCOUNTER — NON-APPOINTMENT (OUTPATIENT)
Age: 84
End: 2024-03-25

## 2024-03-25 VITALS
HEART RATE: 73 BPM | DIASTOLIC BLOOD PRESSURE: 74 MMHG | WEIGHT: 111 LBS | BODY MASS INDEX: 17.92 KG/M2 | OXYGEN SATURATION: 100 % | SYSTOLIC BLOOD PRESSURE: 125 MMHG

## 2024-03-25 DIAGNOSIS — I49.3 VENTRICULAR PREMATURE DEPOLARIZATION: ICD-10-CM

## 2024-03-25 PROCEDURE — 99213 OFFICE O/P EST LOW 20 MIN: CPT

## 2024-03-25 PROCEDURE — 93000 ELECTROCARDIOGRAM COMPLETE: CPT

## 2024-03-25 NOTE — HISTORY OF PRESENT ILLNESS
[FreeTextEntry1] : I had the pleasure of seeing your patient Michelle Brown today in the arrhythmia clinic of [facility]. As you well know the patient is a delightful and anxious 83-year-old female with a history of coronary artery disease, carotid artery disease, reflux disease, hypercholesterolemia who has recently been experiencing periods of dizziness, fatigue, weight loss and palpitations. She reports postural dizziness more significant when she tilts her head back. She describes things as spinning. Her palpitations mainly happen at night although she occasionally feels heart pounding throughout the day. She was noted to have frequent PVCs on ECG by her cardiologist who ordered an event monitor revealing a greater than 20% PVC burden.  As far as her CAD status the patient had undergone a CTA/FFR which suggested a 50-55% lesion in D1. This was felt to be a small vessel and would be managed medically. Her echocardiogram on October 10, 2022 demonstrated bileaflet mitral valve prolapse with mild mitral regurgitation. There was mild AI. Her left atrium was 3.1 cm. There was normal left and right ventricular function with an ejection fraction of 44%. Of note the patient has not tolerated beta-blockers whatsoever.  During her visit on July 24, 2023, the options for therapy were discussed including medical management or ablation. She was loaded on amiodarone and is presently on 200 mg QD. She reports a complete resolution in palpitations and dizziness since starting this medication. She additionally followed-up with an ENT regarding her symptoms of vertigo, however her workup has been unrevealing thus far.  She returns to clinic today for follow-up.  Overall, from a cardiac standpoint Michelle continues to do quite well.  She has had a tough year with the loss of both her mother and her sister.  In addition, she is the primary caregiver for her  who is now 104 years old and has issues with hearing and vision.  She denies any palpitations, lightheadedness, presyncope or syncope.  Since her last visit we had repeated a cardiac monitor from September 25 through October 2, 2023.  Her PVC burden was only 3.5% which is down from 20% prior to being on amiodarone.  Her blood pressure today was 125/74 and her pulse was 73 and regular.  EKG revealed sinus rhythm with first degree AV block and poor R wave progression.

## 2024-03-25 NOTE — DISCUSSION/SUMMARY
[FreeTextEntry1] : In summary, Michelle Brown is a delightful but anxious 83-year-old female with history of coronary artery disease, carotid artery disease, reflux disease, hypercholesterolemia who has recently been experiencing periods of dizziness, fatigue, weight loss and palpitations. She was noted to have frequent PVCs on ECG by her cardiologist who ordered an event monitor revealing a greater than 20% PVC burden.  She was subsequently loaded with amiodarone and has done well from both asymptomatic and an objective standpoint with reduction in PVC burden to 3.5%.  Overall, she is doing well and tolerating the amiodarone.  Therefore, she will continue to follow-up with liver function and thyroid function tests.  If she continues to do well, we can consider decreasing the amiodarone to 100 mg a day.  [EKG obtained to assist in diagnosis and management of assessed problem(s)] : EKG obtained to assist in diagnosis and management of assessed problem(s)

## 2024-04-08 ENCOUNTER — OUTPATIENT (OUTPATIENT)
Dept: OUTPATIENT SERVICES | Facility: HOSPITAL | Age: 84
LOS: 1 days | Discharge: HOME | End: 2024-04-08
Payer: MEDICARE

## 2024-04-08 DIAGNOSIS — Z98.89 OTHER SPECIFIED POSTPROCEDURAL STATES: Chronic | ICD-10-CM

## 2024-04-08 DIAGNOSIS — Z41.9 ENCOUNTER FOR PROCEDURE FOR PURPOSES OTHER THAN REMEDYING HEALTH STATE, UNSPECIFIED: Chronic | ICD-10-CM

## 2024-04-08 DIAGNOSIS — Z90.710 ACQUIRED ABSENCE OF BOTH CERVIX AND UTERUS: Chronic | ICD-10-CM

## 2024-04-08 DIAGNOSIS — G31.84 MILD COGNITIVE IMPAIRMENT OF UNCERTAIN OR UNKNOWN ETIOLOGY: ICD-10-CM

## 2024-04-08 DIAGNOSIS — Z98.41 CATARACT EXTRACTION STATUS, RIGHT EYE: Chronic | ICD-10-CM

## 2024-04-08 DIAGNOSIS — K66.0 PERITONEAL ADHESIONS (POSTPROCEDURAL) (POSTINFECTION): Chronic | ICD-10-CM

## 2024-04-15 PROCEDURE — 90792 PSYCH DIAG EVAL W/MED SRVCS: CPT

## 2024-04-22 ENCOUNTER — NON-APPOINTMENT (OUTPATIENT)
Age: 84
End: 2024-04-22

## 2024-04-22 ENCOUNTER — APPOINTMENT (OUTPATIENT)
Dept: INTERNAL MEDICINE | Facility: CLINIC | Age: 84
End: 2024-04-22
Payer: MEDICARE

## 2024-04-22 VITALS
RESPIRATION RATE: 16 BRPM | OXYGEN SATURATION: 97 % | HEIGHT: 66 IN | BODY MASS INDEX: 17.68 KG/M2 | HEART RATE: 67 BPM | DIASTOLIC BLOOD PRESSURE: 66 MMHG | WEIGHT: 110 LBS | SYSTOLIC BLOOD PRESSURE: 129 MMHG | TEMPERATURE: 98.2 F

## 2024-04-22 VITALS — DIASTOLIC BLOOD PRESSURE: 60 MMHG | SYSTOLIC BLOOD PRESSURE: 102 MMHG

## 2024-04-22 VITALS — SYSTOLIC BLOOD PRESSURE: 104 MMHG | DIASTOLIC BLOOD PRESSURE: 60 MMHG

## 2024-04-22 DIAGNOSIS — I25.10 ATHEROSCLEROTIC HEART DISEASE OF NATIVE CORONARY ARTERY W/OUT ANGINA PECTORIS: ICD-10-CM
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
## 2024-04-22 DIAGNOSIS — Z63.6 DEPENDENT RELATIVE NEEDING CARE AT HOME: ICD-10-CM

## 2024-04-22 DIAGNOSIS — R41.3 OTHER AMNESIA: ICD-10-CM

## 2024-04-22 DIAGNOSIS — I35.1 NONRHEUMATIC AORTIC (VALVE) INSUFFICIENCY: ICD-10-CM

## 2024-04-22 DIAGNOSIS — E78.00 PURE HYPERCHOLESTEROLEMIA, UNSPECIFIED: ICD-10-CM

## 2024-04-22 DIAGNOSIS — R73.03 PREDIABETES.: ICD-10-CM

## 2024-04-22 PROCEDURE — 99214 OFFICE O/P EST MOD 30 MIN: CPT

## 2024-04-22 PROCEDURE — 93000 ELECTROCARDIOGRAM COMPLETE: CPT

## 2024-04-22 PROCEDURE — 36415 COLL VENOUS BLD VENIPUNCTURE: CPT

## 2024-04-22 SDOH — SOCIAL STABILITY - SOCIAL INSECURITY: DEPENDENT RELATIVE NEEDING CARE AT HOME: Z63.6

## 2024-04-23 NOTE — REVIEW OF SYSTEMS
[Fainting] : fainting [Negative] : Psychiatric [Headache] : no headache [Dizziness] : no dizziness [Confusion] : no confusion [Memory Loss] : no memory loss [Unsteady Walking] : no ataxia

## 2024-04-23 NOTE — HISTORY OF PRESENT ILLNESS
[FreeTextEntry8] : CC fall versus syncope  HPI the patient is a 83-year-old female with history of coronary artery disease by cath,Hyperlipidemia, aortic calcification, history of PVCs on amiodarone, anxiety, mild carotid artery disease, mild valvular heart disease, cognitive decline/anxiety/depression who presents for complaint of possible fall versus syncope.  Patient was working in the garden last Thursday fell or syncopized got right up denied headache, dizziness, chest pain, diaphoresis, palpitations.  Presently feels well  not applicable (Male)

## 2024-04-23 NOTE — ASSESSMENT
[FreeTextEntry1] : Is a 83-year-old female with history of coronary artery disease, hyperlipidemia aortic calcifications history of PVCs on amiodarone, anxiety, mild carotid artery disease moderate valvular heart disease cognitive decline depression who presents for follow-up of syncope/mechanical fall  1.  Mechanical fall versus syncope  patient not clear what happened on grass and got up immediately most likely for all hello about given cardiac risk factors EKG showed normal sinus rhythm no ischemic arrhythmias Have a wall check MRI of the head rule out stroke causing imbalance Check electrolytes CBC  2.  Coronary artery disease  continue aspirin aggressive cholesterol follow-up with cardiology  3.  PVCs  High burden on amiodarone for suppression  4.  Prediabetes  check A1c  5.  Hyperlipidemia  continue Pravachol 40 mg check lipid profile  7.  Anxiety Continue buspirone 15 mg as needed From caregiving her elderly  who is 89 years old.

## 2024-04-24 ENCOUNTER — OUTPATIENT (OUTPATIENT)
Dept: OUTPATIENT SERVICES | Facility: HOSPITAL | Age: 84
LOS: 1 days | End: 2024-04-24
Payer: MEDICARE

## 2024-04-24 ENCOUNTER — APPOINTMENT (OUTPATIENT)
Dept: MRI IMAGING | Facility: CLINIC | Age: 84
End: 2024-04-24
Payer: MEDICARE

## 2024-04-24 DIAGNOSIS — Z90.710 ACQUIRED ABSENCE OF BOTH CERVIX AND UTERUS: Chronic | ICD-10-CM

## 2024-04-24 DIAGNOSIS — Z98.89 OTHER SPECIFIED POSTPROCEDURAL STATES: Chronic | ICD-10-CM

## 2024-04-24 DIAGNOSIS — K66.0 PERITONEAL ADHESIONS (POSTPROCEDURAL) (POSTINFECTION): Chronic | ICD-10-CM

## 2024-04-24 DIAGNOSIS — I65.29 OCCLUSION AND STENOSIS OF UNSPECIFIED CAROTID ARTERY: ICD-10-CM
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
## 2024-04-24 DIAGNOSIS — Z41.9 ENCOUNTER FOR PROCEDURE FOR PURPOSES OTHER THAN REMEDYING HEALTH STATE, UNSPECIFIED: Chronic | ICD-10-CM

## 2024-04-24 DIAGNOSIS — Z98.41 CATARACT EXTRACTION STATUS, RIGHT EYE: Chronic | ICD-10-CM

## 2024-04-24 LAB
ALBUMIN SERPL ELPH-MCNC: 4.4 G/DL
ALP BLD-CCNC: 69 U/L
ALT SERPL-CCNC: 24 U/L
ANION GAP SERPL CALC-SCNC: 12 MMOL/L
AST SERPL-CCNC: 29 U/L
BILIRUB SERPL-MCNC: 0.6 MG/DL
BUN SERPL-MCNC: 14 MG/DL
CALCIUM SERPL-MCNC: 9.2 MG/DL
CHLORIDE SERPL-SCNC: 99 MMOL/L
CHOLEST SERPL-MCNC: 165 MG/DL
CO2 SERPL-SCNC: 26 MMOL/L
CREAT SERPL-MCNC: 0.73 MG/DL
EGFR: 82 ML/MIN/1.73M2
ESTIMATED AVERAGE GLUCOSE: 117 MG/DL
GLUCOSE SERPL-MCNC: 110 MG/DL
HBA1C MFR BLD HPLC: 5.7 %
HCT VFR BLD CALC: 43 %
HDLC SERPL-MCNC: 84 MG/DL
HGB BLD-MCNC: 13.6 G/DL
LDLC SERPL CALC-MCNC: 71 MG/DL
MCHC RBC-ENTMCNC: 28.6 PG
MCHC RBC-ENTMCNC: 31.6 GM/DL
MCV RBC AUTO: 90.5 FL
NONHDLC SERPL-MCNC: 82 MG/DL
PLATELET # BLD AUTO: 159 K/UL
POTASSIUM SERPL-SCNC: 4.4 MMOL/L
PROT SERPL-MCNC: 6.5 G/DL
RBC # BLD: 4.75 M/UL
RBC # FLD: 15.6 %
SODIUM SERPL-SCNC: 138 MMOL/L
TRIGL SERPL-MCNC: 55 MG/DL
WBC # FLD AUTO: 5.9 K/UL

## 2024-04-24 PROCEDURE — 70551 MRI BRAIN STEM W/O DYE: CPT | Mod: 26,MH

## 2024-04-24 PROCEDURE — 70551 MRI BRAIN STEM W/O DYE: CPT

## 2024-04-24 RX ORDER — BUSPIRONE HYDROCHLORIDE 15 MG/1
15 TABLET ORAL
Qty: 30 | Refills: 1 | Status: ACTIVE | COMMUNITY
Start: 2023-04-17 | End: 1900-01-01

## 2024-07-03 ENCOUNTER — APPOINTMENT (OUTPATIENT)
Dept: CARDIOLOGY | Facility: CLINIC | Age: 84
End: 2024-07-03

## 2024-07-03 ENCOUNTER — LABORATORY RESULT (OUTPATIENT)
Age: 84
End: 2024-07-03

## 2024-07-03 ENCOUNTER — NON-APPOINTMENT (OUTPATIENT)
Age: 84
End: 2024-07-03

## 2024-07-03 VITALS
HEIGHT: 66 IN | DIASTOLIC BLOOD PRESSURE: 81 MMHG | OXYGEN SATURATION: 98 % | SYSTOLIC BLOOD PRESSURE: 158 MMHG | WEIGHT: 110 LBS | BODY MASS INDEX: 17.68 KG/M2 | HEART RATE: 58 BPM

## 2024-07-03 VITALS — SYSTOLIC BLOOD PRESSURE: 128 MMHG | DIASTOLIC BLOOD PRESSURE: 72 MMHG

## 2024-07-03 DIAGNOSIS — I70.0 ATHEROSCLEROSIS OF AORTA: ICD-10-CM

## 2024-07-03 PROCEDURE — 93000 ELECTROCARDIOGRAM COMPLETE: CPT

## 2024-07-03 PROCEDURE — 99214 OFFICE O/P EST MOD 30 MIN: CPT

## 2024-07-11 ENCOUNTER — RX RENEWAL (OUTPATIENT)
Age: 84
End: 2024-07-11

## 2024-07-26 RX ORDER — ASPIRIN 81 MG/1
81 TABLET, CHEWABLE ORAL
Refills: 0 | Status: ACTIVE | COMMUNITY
Start: 2024-07-26

## 2024-08-11 ENCOUNTER — NON-APPOINTMENT (OUTPATIENT)
Age: 84
End: 2024-08-11

## 2024-08-13 ENCOUNTER — NON-APPOINTMENT (OUTPATIENT)
Age: 84
End: 2024-08-13

## 2024-08-22 ENCOUNTER — APPOINTMENT (OUTPATIENT)
Dept: INTERNAL MEDICINE | Facility: CLINIC | Age: 84
End: 2024-08-22
Payer: MEDICARE

## 2024-08-22 VITALS — SYSTOLIC BLOOD PRESSURE: 132 MMHG | DIASTOLIC BLOOD PRESSURE: 74 MMHG

## 2024-08-22 VITALS
DIASTOLIC BLOOD PRESSURE: 78 MMHG | WEIGHT: 111 LBS | HEART RATE: 68 BPM | HEIGHT: 66 IN | SYSTOLIC BLOOD PRESSURE: 148 MMHG | OXYGEN SATURATION: 98 % | RESPIRATION RATE: 16 BRPM | TEMPERATURE: 98.6 F | BODY MASS INDEX: 17.84 KG/M2

## 2024-08-22 VITALS — DIASTOLIC BLOOD PRESSURE: 78 MMHG | SYSTOLIC BLOOD PRESSURE: 152 MMHG

## 2024-08-22 DIAGNOSIS — N64.4 MASTODYNIA: ICD-10-CM

## 2024-08-22 DIAGNOSIS — R49.0 DYSPHONIA: ICD-10-CM

## 2024-08-22 DIAGNOSIS — Z87.2 PERSONAL HISTORY OF DISEASES OF THE SKIN AND SUBCUTANEOUS TISSUE: ICD-10-CM

## 2024-08-22 DIAGNOSIS — J06.9 ACUTE UPPER RESPIRATORY INFECTION, UNSPECIFIED: ICD-10-CM

## 2024-08-22 DIAGNOSIS — Z86.03 PERSONAL HISTORY OF NEOPLASM OF UNCERTAIN BEHAVIOR: ICD-10-CM

## 2024-08-22 DIAGNOSIS — I49.3 VENTRICULAR PREMATURE DEPOLARIZATION: ICD-10-CM

## 2024-08-22 DIAGNOSIS — G31.84 MILD COGNITIVE IMPAIRMENT, SO STATED: ICD-10-CM

## 2024-08-22 DIAGNOSIS — F41.9 ANXIETY DISORDER, UNSPECIFIED: ICD-10-CM

## 2024-08-22 DIAGNOSIS — R23.8 OTHER SKIN CHANGES: ICD-10-CM

## 2024-08-22 DIAGNOSIS — I25.10 ATHEROSCLEROTIC HEART DISEASE OF NATIVE CORONARY ARTERY W/OUT ANGINA PECTORIS: ICD-10-CM

## 2024-08-22 DIAGNOSIS — M25.511 PAIN IN RIGHT SHOULDER: ICD-10-CM

## 2024-08-22 DIAGNOSIS — Z86.69 PERSONAL HISTORY OF OTHER DISEASES OF THE NERVOUS SYSTEM AND SENSE ORGANS: ICD-10-CM
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
## 2024-08-22 DIAGNOSIS — E78.00 PURE HYPERCHOLESTEROLEMIA, UNSPECIFIED: ICD-10-CM

## 2024-08-22 DIAGNOSIS — R26.81 UNSTEADINESS ON FEET: ICD-10-CM

## 2024-08-22 LAB
BILIRUB UR QL STRIP: NEGATIVE
GLUCOSE UR-MCNC: NEGATIVE
HCG UR QL: 0.2 EU/DL
HGB UR QL STRIP.AUTO: NORMAL
KETONES UR-MCNC: NEGATIVE
LEUKOCYTE ESTERASE UR QL STRIP: NORMAL
NITRITE UR QL STRIP: NEGATIVE
PH UR STRIP: 7
PROT UR STRIP-MCNC: NEGATIVE
SP GR UR STRIP: 1.01

## 2024-08-22 PROCEDURE — 81003 URINALYSIS AUTO W/O SCOPE: CPT | Mod: QW

## 2024-08-22 PROCEDURE — 99214 OFFICE O/P EST MOD 30 MIN: CPT

## 2024-08-22 NOTE — ASSESSMENT
[FreeTextEntry1] : The patient is a 84-year-old female with history of cognitive decline, coronary disease hyperlipidemia gait instability glaucoma, hearing loss, hypercholesterolemia, prediabetes, osteopenia and vitamin D insufficient who presents for follow-up bruise on right hip, gait instability cognitive decline  1 fall No loss of consciousness Mechanical fall Observe for now  2.  Gait instability instability Workup by ENT negative Not orthostatic Evaluated by cardiology Trial of physical therapy if fails consider neurologic evaluation  3.  Coronary artery disease Aggressive cholesterol-lowering may be statin intolerant secondary to memory Follow-up with cardiology  4.  Frequent PVCs On amiodarone 100 mg for suppression decreased due to memory loss  5 hyperlipidemia Started on Praluent injections continue Zetia DC pravastatin  6.  Left hip ecchymosis No evidence of fracture full range of motion warm soaks  .7.Urinary frequency Urine negative  8 cognitive decline Most likely underlying dementia component of stress medications Decreasing amiodarone to 100 mg pravastatin DC'd Will observe for improvement if fails to improve will trial Aricept and further evaluation.  9. Urinary frequency UA dipstick negative for infection

## 2024-08-22 NOTE — HISTORY OF PRESENT ILLNESS
[FreeTextEntry1] : Follow-up for memory loss balance issues follow-up for high blood pressure coronary artery disease hyperlipidemia [de-identified] : The patient is a 84-year-old female with history of cognitive decline, coronary artery disease, hyperlipidemia, glaucoma, hearing loss, hypercholesterolemia, osteopenia, vitamin D insufficiency prediabetes urinary frequency seasonal allergies who presents for follow-up.  Patient complains of memory loss specially names denies headache, some gait instability fell yesterday banged her right hip area bruised able to walk minimal tenderness.  Nausea vomiting fever chills also complains of

## 2024-08-22 NOTE — PHYSICAL EXAM
[Normal Sclera/Conjunctiva] : normal sclera/conjunctiva [Normal Outer Ear/Nose] : the outer ears and nose were normal in appearance [No Joint Swelling] : no joint swelling [No Rash] : no rash [Normal] : affect was normal and insight and judgment were intact [de-identified] : Right hip full range of motion minimal tenderness [de-identified] : Ecchymosis right hip

## 2024-08-28 RX ORDER — ALIROCUMAB 75 MG/ML
75 INJECTION, SOLUTION SUBCUTANEOUS
Qty: 2 | Refills: 5 | Status: DISCONTINUED | COMMUNITY
Start: 2024-08-22 | End: 2024-08-28

## 2024-08-29 RX ORDER — EVOLOCUMAB 140 MG/ML
140 INJECTION, SOLUTION SUBCUTANEOUS
Qty: 2 | Refills: 5 | Status: ACTIVE | COMMUNITY
Start: 2024-08-28 | End: 1900-01-01

## 2024-09-09 ENCOUNTER — RX RENEWAL (OUTPATIENT)
Age: 84
End: 2024-09-09

## 2024-09-29 ENCOUNTER — NON-APPOINTMENT (OUTPATIENT)
Age: 84
End: 2024-09-29

## 2024-09-30 ENCOUNTER — NON-APPOINTMENT (OUTPATIENT)
Age: 84
End: 2024-09-30

## 2024-09-30 ENCOUNTER — APPOINTMENT (OUTPATIENT)
Dept: NEUROLOGY | Facility: CLINIC | Age: 84
End: 2024-09-30
Payer: MEDICARE

## 2024-09-30 ENCOUNTER — APPOINTMENT (OUTPATIENT)
Dept: ELECTROPHYSIOLOGY | Facility: CLINIC | Age: 84
End: 2024-09-30
Payer: MEDICARE

## 2024-09-30 VITALS
HEIGHT: 66 IN | DIASTOLIC BLOOD PRESSURE: 71 MMHG | SYSTOLIC BLOOD PRESSURE: 156 MMHG | OXYGEN SATURATION: 100 % | BODY MASS INDEX: 17.68 KG/M2 | HEART RATE: 62 BPM | WEIGHT: 110 LBS

## 2024-09-30 VITALS
WEIGHT: 110 LBS | HEIGHT: 66 IN | DIASTOLIC BLOOD PRESSURE: 82 MMHG | SYSTOLIC BLOOD PRESSURE: 151 MMHG | HEART RATE: 61 BPM | BODY MASS INDEX: 17.68 KG/M2

## 2024-09-30 DIAGNOSIS — R26.89 OTHER ABNORMALITIES OF GAIT AND MOBILITY: ICD-10-CM

## 2024-09-30 DIAGNOSIS — Z86.59 PERSONAL HISTORY OF OTHER MENTAL AND BEHAVIORAL DISORDERS: ICD-10-CM

## 2024-09-30 DIAGNOSIS — R49.0 DYSPHONIA: ICD-10-CM

## 2024-09-30 DIAGNOSIS — F51.04 PSYCHOPHYSIOLOGIC INSOMNIA: ICD-10-CM

## 2024-09-30 DIAGNOSIS — I99.9 UNSPECIFIED DISORDER OF CIRCULATORY SYSTEM: ICD-10-CM

## 2024-09-30 DIAGNOSIS — F41.9 ANXIETY DISORDER, UNSPECIFIED: ICD-10-CM

## 2024-09-30 DIAGNOSIS — Z63.6 DEPENDENT RELATIVE NEEDING CARE AT HOME: ICD-10-CM

## 2024-09-30 DIAGNOSIS — F06.70 UNSPECIFIED DISORDER OF CIRCULATORY SYSTEM: ICD-10-CM

## 2024-09-30 DIAGNOSIS — I49.3 VENTRICULAR PREMATURE DEPOLARIZATION: ICD-10-CM

## 2024-09-30 PROCEDURE — 93000 ELECTROCARDIOGRAM COMPLETE: CPT

## 2024-09-30 PROCEDURE — 99213 OFFICE O/P EST LOW 20 MIN: CPT

## 2024-09-30 PROCEDURE — 99205 OFFICE O/P NEW HI 60 MIN: CPT

## 2024-09-30 PROCEDURE — G2211 COMPLEX E/M VISIT ADD ON: CPT

## 2024-09-30 RX ORDER — AMIODARONE HYDROCHLORIDE 100 MG/1
100 TABLET ORAL DAILY
Qty: 90 | Refills: 2 | Status: DISCONTINUED | COMMUNITY
Start: 2024-09-30 | End: 2024-09-30

## 2024-09-30 SDOH — SOCIAL STABILITY - SOCIAL INSECURITY: DEPENDENT RELATIVE NEEDING CARE AT HOME: Z63.6

## 2024-09-30 NOTE — DATA REVIEWED
[de-identified] : MRI brain 5/2024: subcortical posterior atrophy, FZK3 MVD. No real pattern of atrophy. [de-identified] : NPT 2024: difficulties in semantic retrieval, non -verbal memory, and mild executive issues.

## 2024-09-30 NOTE — PHYSICAL EXAM
[General Appearance - Alert] : alert [General Appearance - In No Acute Distress] : in no acute distress [Oriented To Time, Place, And Person] : oriented to person, place, and time [Impaired Insight] : insight and judgment were intact [Affect] : the affect was normal [Person] : oriented to person [Place] : oriented to place [Time] : oriented to time [Short Term Intact] : short term memory intact [Remote Intact] : remote memory intact [Registration Intact] : recent registration memory intact [Concentration Intact] : normal concentrating ability [Visual Intact] : visual attention was ~T not ~L decreased [Naming Objects] : no difficulty naming common objects [Repeating Phrases] : no difficulty repeating a phrase [Writing A Sentence] : no difficulty writing a sentence [Fluency] : fluency intact [Comprehension] : comprehension intact [Reading] : reading intact [Current Events] : adequate knowledge of current events [Past History] : adequate knowledge of personal past history [Vocabulary] : adequate range of vocabulary [Total Score ___ / 30] : the patient achieved a score of [unfilled] /30 [Date / Time ___ / 5] : date / time [unfilled] / 5 [Place ___ / 5] : place [unfilled] / 5 [Registration ___ / 3] : registration [unfilled] / 3 [Serial Sevens ___/5] : serial sevens [unfilled] / 5 [Naming 2 Objects ___ / 2] : naming two objects [unfilled] / 2 [Repeating a Sentence ___ / 1] : repeating a sentence [unfilled] / 1 [Writing a Sentence ___ / 1] : write sentence [unfilled] / 1 [3-stage Verbal Command ___ / 3] : three-stage verbal command [unfilled] / 3 [Written Command ___ / 1] : written command [unfilled] / 1 [Copy a Design ___ / 1] : copy a design [unfilled] / 1 [Recall ___ / 3] : recall [unfilled] / 3 [Cranial Nerves Optic (II)] : visual acuity intact bilaterally,  visual fields full to confrontation, pupils equal round and reactive to light [Cranial Nerves Oculomotor (III)] : extraocular motion intact [Cranial Nerves Trigeminal (V)] : facial sensation intact symmetrically [Cranial Nerves Facial (VII)] : face symmetrical [Cranial Nerves Vestibulocochlear (VIII)] : hearing was intact bilaterally [Cranial Nerves Glossopharyngeal (IX)] : tongue and palate midline [Cranial Nerves Accessory (XI - Cranial And Spinal)] : head turning and shoulder shrug symmetric [Cranial Nerves Hypoglossal (XII)] : there was no tongue deviation with protrusion [Motor Tone] : muscle tone was normal in all four extremities [Motor Strength] : muscle strength was normal in all four extremities [Involuntary Movements] : no involuntary movements were seen [No Muscle Atrophy] : normal bulk in all four extremities [Motor Handedness Right-Handed] : the patient is right hand dominant [Motor Strength Upper Extremities Bilaterally] : strength was normal in both upper extremities [Motor Strength Lower Extremities Bilaterally] : strength was normal in both lower extremities [Sensation Tactile Decrease] : light touch was intact [Balance] : balance was intact [Limited Balance] : balance was intact [Past-pointing] : there was no past-pointing [Tremor] : no tremor present [2+] : Brachioradialis left 2+ [3+] : Ankle jerk left 3+ [Plantar Reflex Right Only] : normal on the right [Plantar Reflex Left Only] : normal on the left [FreeTextEntry4] : Mental Status Exam Presidents: 4/5 Alternating Pattern: ok Spiral: ok Clock: 3/3 Repetition: ok  R/L discrimination on self and examiner: ok Cross-line commands: ok Praxis: -Motor: ok -Dynamic/Luria: ok -Ideomotor/Imitation: ok  -Ideational/writing/closing-in: ok -Dressing: ok. [FreeTextEntry6] : L knee - buckling at gait. [FreeTextEntry8] : L knee - buckling at gait - no parkinsonism, no ataxia. [Sclera] : the sclera and conjunctiva were normal [PERRL With Normal Accommodation] : pupils were equal in size, round, reactive to light, with normal accommodation [Extraocular Movements] : extraocular movements were intact [No APD] : no afferent pupillary defect [No SHANON] : no internuclear ophthalmoplegia [Full Visual Field] : full visual field [Outer Ear] : the ears and nose were normal in appearance [Neck Appearance] : the appearance of the neck was normal [Edema] : there was no peripheral edema [] : no rash

## 2024-09-30 NOTE — DISCUSSION/SUMMARY
[FreeTextEntry1] : In summary, Michelle Brown is a delightful but anxious 83-year-old female with history of coronary artery disease, carotid artery disease, reflux disease, hypercholesterolemia who has recently been experiencing periods of dizziness, fatigue, weight loss and palpitations. She was noted to have frequent PVCs on ECG by her cardiologist who ordered an event monitor revealing a greater than 20% PVC burden.  She was subsequently loaded with amiodarone and has done well from both asymptomatic and an objective standpoint with reduction in PVC burden to 3.5%. Since her last visit, she was started on PCSK9 inhibitor and switched off of her statin therapy given concerns for memory loss. Since being switched 3 weeks ago, Ms. Brown reports discontinuing her amiodarone therapy. During our visit today, it was stressed that she needs to continue taking the amiodarone given it is suppressing her PVCs which now she is asymptomatic. We discussed how there is no known interaction between PCSK9 inhibitors and amiodarone, and that they can safely be taken together. On serial checks of her thyroid and liver function, all has been within normal limits.   To note, I believe that the stressful nature of Ms. Brown's home life and being the single provider for her dependent , and continued insomnia is contributing to the Ms. Brown's memory issues. During the visit, stressed the need for not only physical health hygiene, but mental health hygiene.  -Continue Amiodarone 100 mg Daily -Serial monitoring of her thyroid and liver function tests -Instructed patient to follow up with PCP for possible aid in assistance at home with evaluation from a community health worker, etc.   I, Dr. Hebert, personally performed the evaluation and management (E/M) services for this established patient who presents today with (a) new problem(s)/exacerbation of (an) existing condition(s). That E/M includes conducting the examination, assessing all new/exacerbated conditions, and establishing a new plan of care. Today, my fellow, Gopal Snowden, was here to observe my evaluation and management services for this new problem/exacerbated condition to be followed going forward. [EKG obtained to assist in diagnosis and management of assessed problem(s)] : EKG obtained to assist in diagnosis and management of assessed problem(s)

## 2024-09-30 NOTE — ASSESSMENT
[FreeTextEntry1] : Assessment: 83yo RH WW with CAD, HLD, anxiety, here with concern of forgetfulness. Pt testing very well, minor glitches on pentagons and recall, but overall ok NPT are benign from Dr. Vitale, with semantic and executive deficits, but mild. MRI brain 2024 with significant MVD (FZK 3) and posterior subcortical atrophy, in relation to gliosis. Motor: L knee - buckling at gait, but no real segmental weakness, except thigh flexion on hip and quads tend to give out - DTRs are 3+ in LE. Re anxiety, does not take Buspar consistently, and breaks tab in 1/2 or 1/4.  Diagnostic Impression: -forgetfulness -vascular MCI -gait difficulty - MSK -caregiver stress -insomnia -anxiety  Plan: -refer ENT Re voice  -will ask Trini for help Re caregiving -USCD/TCD r/o stenoses -MRI CTLS spine r/o lesions -start Buspar 5mg BID - reconsider in a few weeks and increase as needed; alternatively, we can consider Lexapro.

## 2024-09-30 NOTE — HISTORY OF PRESENT ILLNESS
[FreeTextEntry1] : COVID unclear if treated. COVID VACCINE FULL.  PMH: 85yo RH WW with CAD, HLD, anxiety, here for forgetfulness.   HPI: Since , gradual worsening, WFD, mild issues understanding others, some organizational issues.  -Memory: some names, even friends -Language: WFD -Visuo-spatial/Orientation: some issues -Praxis: mostly ok -Executive fx/Decision making/Attention/Multitasking: some difficulty with organization  -Change in voice, s/p a bad and long cold, resulting in grouchy voice, slowly getting better. Did not see ENT.   -Sleep: poor, frequent rumination about her issues and her 's health   -Appetite: stable, no    -Motor symptoms: L knee jennifer; a recent fall, 3 weeks ago, backward on steps   -B/B: ok   -Psychiatric symptoms: anxiety   -Functional status/Living Situation: lives with   [employer] [place] in Activities of Daily Livin. Bathing/Showerin 2. Dressin 3. Toiletin 4. Transferrin 5. Continence:1 6: Feedin   TOTAL:6   [place] Instrumental Activities of Daily Living: A. Ability to Use Telephone:1 B. Shoppin C. Food Preparation:1 D. Housekeepin E. Laundry:1 F. Transportation:1 G. Responsibility for Own Medications:1 H: Ability to Handle Finances:1   TOTAL:8   CDR: 0.5   -Professional status:   PCP and other physicians: -PCP: Patsy   Workup done: -NPT : difficulties in semantic retrieval, non -verbal memory, and mild executive issues.

## 2024-09-30 NOTE — HISTORY OF PRESENT ILLNESS
[FreeTextEntry1] : I had the pleasure of seeing your patient Michelle Brown today in the arrhythmia clinic of [facility]. As you well know the patient is a delightful and anxious 83-year-old female with a history of coronary artery disease, carotid artery disease, reflux disease, hypercholesterolemia who has recently been experiencing periods of dizziness, fatigue, weight loss and palpitations. She reports postural dizziness more significant when she tilts her head back. She describes things as spinning. Her palpitations mainly happen at night although she occasionally feels heart pounding throughout the day. She was noted to have frequent PVCs on ECG by her cardiologist who ordered an event monitor revealing a greater than 20% PVC burden.  As far as her CAD status the patient had undergone a CTA/FFR which suggested a 50-55% lesion in D1. This was felt to be a small vessel and would be managed medically. Her echocardiogram on October 10, 2022 demonstrated bileaflet mitral valve prolapse with mild mitral regurgitation. There was mild AI. Her left atrium was 3.1 cm. There was normal left and right ventricular function with an ejection fraction of 44%. Of note the patient has not tolerated beta-blockers whatsoever.  During her visit on July 24, 2023, the options for therapy were discussed including medical management or ablation. She was loaded on amiodarone and is presently on 200 mg QD. Repeat holter monitor showed a decrease in her PVC burden from 20% down to 3.5%.  She additionally followed-up with an ENT regarding her symptoms of vertigo, however her workup has been unrevealing thus far. She continued to complain of memory loss and confusion, subsequently was switched from her statin therapy to PCSK9 inhbitor (Repatha) to rule out possible statin induced memory issues. At last visit, her Amiodarone was decreased to 100 mg Daily. Most recent liver function tests and thyroid function tests were within normal limits.  Overall, from a cardiac standpoint Michelle continues to do quite well.  Her main issue is that she is the primary caregiver for her  who is now 104 years old and has issues with hearing and vision. She states that she has some help at home with a , but overall the burden of his care is on her, which she states is truly stressful.  She states that since starting her PCSK9 inhibitor, she discontinued taking her amiodarone due to "being told that they cannot interact with each other." She denies any palpitations, lightheadedness, presyncope or syncope.   Her blood pressure today was 156/71 and her pulse was 62 BPM and regular.  EKG revealed sinus rhythm with first degree AV block and poor R wave progression.

## 2024-09-30 NOTE — HISTORY OF PRESENT ILLNESS
[FreeTextEntry1] : COVID unclear if treated. COVID VACCINE FULL.  PMH: 83yo RH WW with CAD, HLD, anxiety, here for forgetfulness.   HPI: Since , gradual worsening, WFD, mild issues understanding others, some organizational issues.  -Memory: some names, even friends -Language: WFD -Visuo-spatial/Orientation: some issues -Praxis: mostly ok -Executive fx/Decision making/Attention/Multitasking: some difficulty with organization  -Change in voice, s/p a bad and long cold, resulting in grouchy voice, slowly getting better. Did not see ENT.   -Sleep: poor, frequent rumination about her issues and her 's health   -Appetite: stable, no    -Motor symptoms: L knee jennifer; a recent fall, 3 weeks ago, backward on steps   -B/B: ok   -Psychiatric symptoms: anxiety   -Functional status/Living Situation: lives with   [employer] [place] in Activities of Daily Livin. Bathing/Showerin 2. Dressin 3. Toiletin 4. Transferrin 5. Continence:1 6: Feedin   TOTAL:6   [place] Instrumental Activities of Daily Living: A. Ability to Use Telephone:1 B. Shoppin C. Food Preparation:1 D. Housekeepin E. Laundry:1 F. Transportation:1 G. Responsibility for Own Medications:1 H: Ability to Handle Finances:1   TOTAL:8   CDR: 0.5   -Professional status:   PCP and other physicians: -PCP: Patsy   Workup done: -NPT : difficulties in semantic retrieval, non -verbal memory, and mild executive issues.

## 2024-09-30 NOTE — ASSESSMENT
[FreeTextEntry1] : Assessment: 85yo RH WW with CAD, HLD, anxiety, here with concern of forgetfulness. Pt testing very well, minor glitches on pentagons and recall, but overall ok NPT are benign from Dr. Vitale, with semantic and executive deficits, but mild. MRI brain 2024 with significant MVD (FZK 3) and posterior subcortical atrophy, in relation to gliosis. Motor: L knee - buckling at gait, but no real segmental weakness, except thigh flexion on hip and quads tend to give out - DTRs are 3+ in LE. Re anxiety, does not take Buspar consistently, and breaks tab in 1/2 or 1/4.  Diagnostic Impression: -forgetfulness -vascular MCI -gait difficulty - MSK -caregiver stress -insomnia -anxiety  Plan: -refer ENT Re voice  -will ask Trini for help Re caregiving -USCD/TCD r/o stenoses -MRI CTLS spine r/o lesions -start Buspar 5mg BID - reconsider in a few weeks and increase as needed; alternatively, we can consider Lexapro.

## 2024-09-30 NOTE — DATA REVIEWED
[de-identified] : MRI brain 5/2024: subcortical posterior atrophy, FZK3 MVD. No real pattern of atrophy. [de-identified] : NPT 2024: difficulties in semantic retrieval, non -verbal memory, and mild executive issues.

## 2024-09-30 NOTE — PHYSICAL EXAM
[Well Developed] : well developed [Well Nourished] : well nourished [No Acute Distress] : no acute distress [Normal S1, S2] : normal S1, S2 [No Murmur] : no murmur [No Rub] : no rub [No Gallop] : no gallop [Clear Lung Fields] : clear lung fields [Good Air Entry] : good air entry [No Respiratory Distress] : no respiratory distress  [No Edema] : no edema [No Cyanosis] : no cyanosis [No Clubbing] : no clubbing [Moves all extremities] : moves all extremities [Normal Speech] : normal speech [Alert and Oriented] : alert and oriented

## 2024-10-02 ENCOUNTER — NON-APPOINTMENT (OUTPATIENT)
Age: 84
End: 2024-10-02

## 2024-10-03 ENCOUNTER — APPOINTMENT (OUTPATIENT)
Dept: INTERNAL MEDICINE | Facility: CLINIC | Age: 84
End: 2024-10-03
Payer: MEDICARE

## 2024-10-03 ENCOUNTER — LABORATORY RESULT (OUTPATIENT)
Age: 84
End: 2024-10-03

## 2024-10-03 VITALS
WEIGHT: 109.6 LBS | RESPIRATION RATE: 16 BRPM | BODY MASS INDEX: 17.61 KG/M2 | SYSTOLIC BLOOD PRESSURE: 124 MMHG | HEIGHT: 66 IN | TEMPERATURE: 98 F | OXYGEN SATURATION: 99 % | HEART RATE: 62 BPM | DIASTOLIC BLOOD PRESSURE: 70 MMHG

## 2024-10-03 PROCEDURE — G0008: CPT

## 2024-10-03 PROCEDURE — G0439: CPT

## 2024-10-03 PROCEDURE — 90662 IIV NO PRSV INCREASED AG IM: CPT

## 2024-10-03 NOTE — REASON FOR VISIT
[Annual Wellness Visit] : an annual wellness visit [FreeTextEntry1] : Comprehensive annual physical exam follow-up for multiple medical problems

## 2024-10-03 NOTE — HISTORY OF PRESENT ILLNESS
[FreeTextEntry1] : Comprehensive annual physical examination follow-up for multiple medical problems and concern regarding memory [de-identified] : Patient is an 84-year-old female with history of coronary artery disease, hyperlipidemia, dense breast, anxiety, mild cognitive impairment, mitral regurgitation, osteopenia prediabetes seasonal allergies visit vitamin D insufficiency urinary incontinence mild who presents for comprehensive annual physical examination patient complains of poor memory at times stress and some unsteadiness when walking right knee is giving out she denies chest pain, shortness of breath this exertion palpitation lightheadedness dizziness.

## 2024-10-03 NOTE — REVIEW OF SYSTEMS
[Dysuria] : no dysuria [Incontinence] : incontinence [Nocturia] : no nocturia [Poor Libido] : libido not poor [Hematuria] : no hematuria [Frequency] : frequency [Vaginal Discharge] : no vaginal discharge [Dysmenorrhea] : no dysmenorrhea [Joint Pain] : no joint pain [Joint Stiffness] : no joint stiffness [Joint Swelling] : no joint swelling [Muscle Weakness] : no muscle weakness [Muscle Pain] : no muscle pain [Back Pain] : no back pain [Headache] : no headache [Dizziness] : no dizziness [Fainting] : no fainting [Confusion] : no confusion [Memory Loss] : memory loss [Unsteady Walking] : ataxia [Suicidal] : not suicidal [Insomnia] : insomnia [Anxiety] : anxiety [Depression] : no depression [Negative] : Heme/Lymph [FreeTextEntry9] : Abilities of the knees

## 2024-10-04 ENCOUNTER — NON-APPOINTMENT (OUTPATIENT)
Age: 84
End: 2024-10-04

## 2024-10-07 ENCOUNTER — APPOINTMENT (OUTPATIENT)
Dept: NEUROLOGY | Facility: CLINIC | Age: 84
End: 2024-10-07
Payer: MEDICARE

## 2024-10-07 LAB
25(OH)D3 SERPL-MCNC: 26.3 NG/ML
ALBUMIN SERPL ELPH-MCNC: 4.7 G/DL
ALP BLD-CCNC: 78 U/L
ALT SERPL-CCNC: 18 U/L
ANION GAP SERPL CALC-SCNC: 14 MMOL/L
APPEARANCE: CLEAR
AST SERPL-CCNC: 25 U/L
BILIRUB SERPL-MCNC: 0.5 MG/DL
BILIRUBIN URINE: NEGATIVE
BLOOD URINE: NEGATIVE
BUN SERPL-MCNC: 17 MG/DL
CALCIUM SERPL-MCNC: 9.7 MG/DL
CHLORIDE SERPL-SCNC: 102 MMOL/L
CHOLEST SERPL-MCNC: 180 MG/DL
CO2 SERPL-SCNC: 27 MMOL/L
COLOR: YELLOW
CREAT SERPL-MCNC: 0.69 MG/DL
EGFR: 86 ML/MIN/1.73M2
ESTIMATED AVERAGE GLUCOSE: 120 MG/DL
GLUCOSE QUALITATIVE U: NEGATIVE MG/DL
GLUCOSE SERPL-MCNC: 86 MG/DL
HBA1C MFR BLD HPLC: 5.8 %
HCT VFR BLD CALC: 45.9 %
HDLC SERPL-MCNC: 101 MG/DL
HGB BLD-MCNC: 14.4 G/DL
KETONES URINE: ABNORMAL MG/DL
LDLC SERPL CALC-MCNC: 65 MG/DL
LEUKOCYTE ESTERASE URINE: ABNORMAL
MCHC RBC-ENTMCNC: 28.9 PG
MCHC RBC-ENTMCNC: 31.4 GM/DL
MCV RBC AUTO: 92.2 FL
NITRITE URINE: NEGATIVE
NONHDLC SERPL-MCNC: 79 MG/DL
PH URINE: 6
PLATELET # BLD AUTO: 210 K/UL
POTASSIUM SERPL-SCNC: 4.3 MMOL/L
PROT SERPL-MCNC: 6.9 G/DL
PROTEIN URINE: NEGATIVE MG/DL
RBC # BLD: 4.98 M/UL
RBC # FLD: 14.7 %
SODIUM SERPL-SCNC: 143 MMOL/L
SPECIFIC GRAVITY URINE: 1.02
TRIGL SERPL-MCNC: 73 MG/DL
UROBILINOGEN URINE: 1 MG/DL
WBC # FLD AUTO: 8.3 K/UL

## 2024-10-07 PROCEDURE — 93880 EXTRACRANIAL BILAT STUDY: CPT

## 2024-10-07 PROCEDURE — 93888 INTRACRANIAL LIMITED STUDY: CPT

## 2024-10-09 ENCOUNTER — NON-APPOINTMENT (OUTPATIENT)
Age: 84
End: 2024-10-09

## 2024-10-15 ENCOUNTER — NON-APPOINTMENT (OUTPATIENT)
Age: 84
End: 2024-10-15

## 2024-10-16 ENCOUNTER — NON-APPOINTMENT (OUTPATIENT)
Age: 84
End: 2024-10-16

## 2024-10-16 ENCOUNTER — OUTPATIENT (OUTPATIENT)
Dept: OUTPATIENT SERVICES | Facility: HOSPITAL | Age: 84
LOS: 1 days | End: 2024-10-16
Payer: MEDICARE

## 2024-10-16 ENCOUNTER — APPOINTMENT (OUTPATIENT)
Dept: MRI IMAGING | Facility: CLINIC | Age: 84
End: 2024-10-16
Payer: MEDICARE

## 2024-10-16 DIAGNOSIS — Z98.41 CATARACT EXTRACTION STATUS, RIGHT EYE: Chronic | ICD-10-CM

## 2024-10-16 DIAGNOSIS — Z98.89 OTHER SPECIFIED POSTPROCEDURAL STATES: Chronic | ICD-10-CM

## 2024-10-16 DIAGNOSIS — K66.0 PERITONEAL ADHESIONS (POSTPROCEDURAL) (POSTINFECTION): Chronic | ICD-10-CM

## 2024-10-16 DIAGNOSIS — Z41.9 ENCOUNTER FOR PROCEDURE FOR PURPOSES OTHER THAN REMEDYING HEALTH STATE, UNSPECIFIED: Chronic | ICD-10-CM

## 2024-10-16 DIAGNOSIS — Z90.710 ACQUIRED ABSENCE OF BOTH CERVIX AND UTERUS: Chronic | ICD-10-CM

## 2024-10-16 DIAGNOSIS — I99.9 UNSPECIFIED DISORDER OF CIRCULATORY SYSTEM: ICD-10-CM

## 2024-10-16 DIAGNOSIS — R26.89 OTHER ABNORMALITIES OF GAIT AND MOBILITY: ICD-10-CM

## 2024-10-16 DIAGNOSIS — Z63.6 DEPENDENT RELATIVE NEEDING CARE AT HOME: ICD-10-CM

## 2024-10-16 PROCEDURE — 72141 MRI NECK SPINE W/O DYE: CPT | Mod: 26,MH

## 2024-10-16 PROCEDURE — 72146 MRI CHEST SPINE W/O DYE: CPT | Mod: 26,MH

## 2024-10-16 PROCEDURE — 72148 MRI LUMBAR SPINE W/O DYE: CPT | Mod: 26,MH

## 2024-10-16 SDOH — SOCIAL STABILITY - SOCIAL INSECURITY: DEPENDENT RELATIVE NEEDING CARE AT HOME: Z63.6

## 2024-10-18 ENCOUNTER — APPOINTMENT (OUTPATIENT)
Dept: CV DIAGNOSITCS | Facility: HOSPITAL | Age: 84
End: 2024-10-18

## 2024-10-18 ENCOUNTER — RESULT REVIEW (OUTPATIENT)
Age: 84
End: 2024-10-18

## 2024-10-18 ENCOUNTER — OUTPATIENT (OUTPATIENT)
Dept: OUTPATIENT SERVICES | Facility: HOSPITAL | Age: 84
LOS: 1 days | End: 2024-10-18
Payer: MEDICARE

## 2024-10-18 DIAGNOSIS — K66.0 PERITONEAL ADHESIONS (POSTPROCEDURAL) (POSTINFECTION): Chronic | ICD-10-CM

## 2024-10-18 DIAGNOSIS — Z98.41 CATARACT EXTRACTION STATUS, RIGHT EYE: Chronic | ICD-10-CM

## 2024-10-18 DIAGNOSIS — Z98.89 OTHER SPECIFIED POSTPROCEDURAL STATES: Chronic | ICD-10-CM

## 2024-10-18 DIAGNOSIS — I34.0 NONRHEUMATIC MITRAL (VALVE) INSUFFICIENCY: ICD-10-CM

## 2024-10-18 DIAGNOSIS — Z90.710 ACQUIRED ABSENCE OF BOTH CERVIX AND UTERUS: Chronic | ICD-10-CM

## 2024-10-18 DIAGNOSIS — Z41.9 ENCOUNTER FOR PROCEDURE FOR PURPOSES OTHER THAN REMEDYING HEALTH STATE, UNSPECIFIED: Chronic | ICD-10-CM

## 2024-10-18 PROCEDURE — 93356 MYOCRD STRAIN IMG SPCKL TRCK: CPT

## 2024-10-18 PROCEDURE — 93306 TTE W/DOPPLER COMPLETE: CPT | Mod: 26

## 2024-11-11 RX ORDER — LOTEPREDNOL ETABONATE 3.8 MG/G
0.38 GEL OPHTHALMIC
Refills: 0 | Status: ACTIVE | COMMUNITY
Start: 2024-11-11

## 2024-11-11 RX ORDER — NETARSUDIL 0.2 MG/ML
0.02 SOLUTION/ DROPS OPHTHALMIC; TOPICAL
Refills: 0 | Status: ACTIVE | COMMUNITY
Start: 2024-11-11

## 2024-11-11 RX ORDER — LATANOPROST/PF 0.005 %
0.01 DROPS OPHTHALMIC (EYE)
Refills: 0 | Status: ACTIVE | COMMUNITY
Start: 2024-11-11

## 2024-11-11 RX ORDER — DORZOLAMIDE HYDROCHLORIDE TIMOLOL MALEATE 20; 5 MG/ML; MG/ML
2-0.5 SOLUTION/ DROPS OPHTHALMIC
Refills: 0 | Status: ACTIVE | COMMUNITY
Start: 2024-11-11

## 2024-11-20 PROCEDURE — 72141 MRI NECK SPINE W/O DYE: CPT

## 2024-11-20 PROCEDURE — 72146 MRI CHEST SPINE W/O DYE: CPT

## 2024-11-20 PROCEDURE — 93356 MYOCRD STRAIN IMG SPCKL TRCK: CPT

## 2024-11-20 PROCEDURE — 72148 MRI LUMBAR SPINE W/O DYE: CPT

## 2024-11-20 PROCEDURE — 93306 TTE W/DOPPLER COMPLETE: CPT

## 2024-12-14 ENCOUNTER — NON-APPOINTMENT (OUTPATIENT)
Age: 84
End: 2024-12-14

## 2024-12-16 ENCOUNTER — NON-APPOINTMENT (OUTPATIENT)
Age: 84
End: 2024-12-16

## 2024-12-16 ENCOUNTER — APPOINTMENT (OUTPATIENT)
Dept: CARDIOLOGY | Facility: CLINIC | Age: 84
End: 2024-12-16
Payer: MEDICARE

## 2024-12-16 VITALS
BODY MASS INDEX: 17.52 KG/M2 | DIASTOLIC BLOOD PRESSURE: 69 MMHG | HEIGHT: 66 IN | OXYGEN SATURATION: 99 % | HEART RATE: 64 BPM | WEIGHT: 109 LBS | SYSTOLIC BLOOD PRESSURE: 117 MMHG

## 2024-12-16 DIAGNOSIS — I25.10 ATHEROSCLEROTIC HEART DISEASE OF NATIVE CORONARY ARTERY W/OUT ANGINA PECTORIS: ICD-10-CM

## 2024-12-16 PROCEDURE — 93000 ELECTROCARDIOGRAM COMPLETE: CPT

## 2024-12-16 PROCEDURE — 99214 OFFICE O/P EST MOD 30 MIN: CPT

## 2024-12-17 LAB
FOLATE SERPL-MCNC: 14.2 NG/ML
TSH SERPL-ACNC: 0.79 UIU/ML
VIT B12 SERPL-MCNC: 705 PG/ML

## 2024-12-26 ENCOUNTER — RX RENEWAL (OUTPATIENT)
Age: 84
End: 2024-12-26

## 2024-12-27 DIAGNOSIS — R51.9 HEADACHE, UNSPECIFIED: ICD-10-CM

## 2025-01-14 ENCOUNTER — APPOINTMENT (OUTPATIENT)
Dept: OPHTHALMOLOGY | Facility: CLINIC | Age: 85
End: 2025-01-14
Payer: MEDICARE

## 2025-01-14 ENCOUNTER — NON-APPOINTMENT (OUTPATIENT)
Age: 85
End: 2025-01-14

## 2025-01-14 PROCEDURE — 92020 GONIOSCOPY: CPT

## 2025-01-14 PROCEDURE — 92083 EXTENDED VISUAL FIELD XM: CPT

## 2025-01-14 PROCEDURE — 92133 CPTRZD OPH DX IMG PST SGM ON: CPT

## 2025-01-14 PROCEDURE — 92004 COMPRE OPH EXAM NEW PT 1/>: CPT

## 2025-01-14 PROCEDURE — 99199 UNLISTED SPECIAL SVC PX/RPRT: CPT | Mod: NC

## 2025-01-17 ENCOUNTER — APPOINTMENT (OUTPATIENT)
Dept: OPHTHALMOLOGY | Facility: CLINIC | Age: 85
End: 2025-01-17
Payer: MEDICARE

## 2025-01-17 ENCOUNTER — NON-APPOINTMENT (OUTPATIENT)
Age: 85
End: 2025-01-17

## 2025-01-17 PROCEDURE — 92012 INTRM OPH EXAM EST PATIENT: CPT

## 2025-01-21 DIAGNOSIS — J20.9 ACUTE BRONCHITIS, UNSPECIFIED: ICD-10-CM

## 2025-01-21 RX ORDER — AZITHROMYCIN 250 MG/1
250 TABLET, FILM COATED ORAL
Qty: 1 | Refills: 0 | Status: ACTIVE | COMMUNITY
Start: 2025-01-21 | End: 1900-01-01

## 2025-01-24 ENCOUNTER — NON-APPOINTMENT (OUTPATIENT)
Age: 85
End: 2025-01-24

## 2025-01-24 ENCOUNTER — APPOINTMENT (OUTPATIENT)
Dept: OPHTHALMOLOGY | Facility: CLINIC | Age: 85
End: 2025-01-24
Payer: MEDICARE

## 2025-01-24 PROCEDURE — 92012 INTRM OPH EXAM EST PATIENT: CPT

## 2025-02-04 ENCOUNTER — APPOINTMENT (OUTPATIENT)
Dept: ULTRASOUND IMAGING | Facility: CLINIC | Age: 85
End: 2025-02-04
Payer: MEDICARE

## 2025-02-04 ENCOUNTER — RESULT REVIEW (OUTPATIENT)
Age: 85
End: 2025-02-04

## 2025-02-04 ENCOUNTER — APPOINTMENT (OUTPATIENT)
Dept: MAMMOGRAPHY | Facility: CLINIC | Age: 85
End: 2025-02-04
Payer: MEDICARE

## 2025-02-04 ENCOUNTER — OUTPATIENT (OUTPATIENT)
Dept: OUTPATIENT SERVICES | Facility: HOSPITAL | Age: 85
LOS: 1 days | End: 2025-02-04
Payer: MEDICARE

## 2025-02-04 DIAGNOSIS — Z98.89 OTHER SPECIFIED POSTPROCEDURAL STATES: Chronic | ICD-10-CM

## 2025-02-04 DIAGNOSIS — Z90.710 ACQUIRED ABSENCE OF BOTH CERVIX AND UTERUS: Chronic | ICD-10-CM

## 2025-02-04 DIAGNOSIS — Z98.41 CATARACT EXTRACTION STATUS, RIGHT EYE: Chronic | ICD-10-CM

## 2025-02-04 DIAGNOSIS — K66.0 PERITONEAL ADHESIONS (POSTPROCEDURAL) (POSTINFECTION): Chronic | ICD-10-CM

## 2025-02-04 DIAGNOSIS — Z41.9 ENCOUNTER FOR PROCEDURE FOR PURPOSES OTHER THAN REMEDYING HEALTH STATE, UNSPECIFIED: Chronic | ICD-10-CM

## 2025-02-04 DIAGNOSIS — R92.30 DENSE BREASTS, UNSPECIFIED: ICD-10-CM

## 2025-02-04 PROCEDURE — 76641 ULTRASOUND BREAST COMPLETE: CPT

## 2025-02-04 PROCEDURE — 76641 ULTRASOUND BREAST COMPLETE: CPT | Mod: 26,50,GA

## 2025-02-04 PROCEDURE — 77067 SCR MAMMO BI INCL CAD: CPT | Mod: 26

## 2025-02-04 PROCEDURE — 77063 BREAST TOMOSYNTHESIS BI: CPT | Mod: 26

## 2025-02-04 PROCEDURE — 77063 BREAST TOMOSYNTHESIS BI: CPT

## 2025-02-04 PROCEDURE — 77067 SCR MAMMO BI INCL CAD: CPT

## 2025-02-07 ENCOUNTER — NON-APPOINTMENT (OUTPATIENT)
Age: 85
End: 2025-02-07

## 2025-02-07 ENCOUNTER — APPOINTMENT (OUTPATIENT)
Dept: OPHTHALMOLOGY | Facility: CLINIC | Age: 85
End: 2025-02-07
Payer: MEDICARE

## 2025-02-07 PROCEDURE — 92012 INTRM OPH EXAM EST PATIENT: CPT

## 2025-02-11 ENCOUNTER — APPOINTMENT (OUTPATIENT)
Dept: OPHTHALMOLOGY | Facility: CLINIC | Age: 85
End: 2025-02-11
Payer: MEDICARE

## 2025-02-11 ENCOUNTER — NON-APPOINTMENT (OUTPATIENT)
Age: 85
End: 2025-02-11

## 2025-02-11 PROCEDURE — 92012 INTRM OPH EXAM EST PATIENT: CPT

## 2025-02-25 ENCOUNTER — APPOINTMENT (OUTPATIENT)
Dept: OPHTHALMOLOGY | Facility: CLINIC | Age: 85
End: 2025-02-25
Payer: MEDICARE

## 2025-02-25 ENCOUNTER — APPOINTMENT (OUTPATIENT)
Dept: OPHTHALMOLOGY | Facility: CLINIC | Age: 85
End: 2025-02-25

## 2025-02-25 ENCOUNTER — NON-APPOINTMENT (OUTPATIENT)
Age: 85
End: 2025-02-25

## 2025-02-25 PROCEDURE — 92083 EXTENDED VISUAL FIELD XM: CPT

## 2025-02-25 PROCEDURE — 76514 ECHO EXAM OF EYE THICKNESS: CPT

## 2025-02-25 PROCEDURE — 92012 INTRM OPH EXAM EST PATIENT: CPT

## 2025-03-01 ENCOUNTER — NON-APPOINTMENT (OUTPATIENT)
Age: 85
End: 2025-03-01

## 2025-03-03 LAB
APPEARANCE: CLEAR
BILIRUBIN URINE: NEGATIVE
BLOOD URINE: NEGATIVE
COLOR: NORMAL
GLUCOSE QUALITATIVE U: NEGATIVE MG/DL
KETONES URINE: NEGATIVE MG/DL
LEUKOCYTE ESTERASE URINE: NEGATIVE
NITRITE URINE: NEGATIVE
PH URINE: 7
PROTEIN URINE: NEGATIVE MG/DL
SPECIFIC GRAVITY URINE: 1.02
UROBILINOGEN URINE: 1 MG/DL

## 2025-03-12 ENCOUNTER — NON-APPOINTMENT (OUTPATIENT)
Age: 85
End: 2025-03-12

## 2025-03-12 ENCOUNTER — APPOINTMENT (OUTPATIENT)
Dept: OPHTHALMOLOGY | Facility: CLINIC | Age: 85
End: 2025-03-12
Payer: MEDICARE

## 2025-03-12 PROCEDURE — 92012 INTRM OPH EXAM EST PATIENT: CPT

## 2025-03-13 ENCOUNTER — NON-APPOINTMENT (OUTPATIENT)
Age: 85
End: 2025-03-13

## 2025-03-24 ENCOUNTER — APPOINTMENT (OUTPATIENT)
Dept: INTERNAL MEDICINE | Facility: CLINIC | Age: 85
End: 2025-03-24
Payer: MEDICARE

## 2025-03-24 VITALS
DIASTOLIC BLOOD PRESSURE: 80 MMHG | HEIGHT: 66 IN | HEART RATE: 56 BPM | BODY MASS INDEX: 16.55 KG/M2 | RESPIRATION RATE: 15 BRPM | OXYGEN SATURATION: 98 % | SYSTOLIC BLOOD PRESSURE: 117 MMHG | WEIGHT: 103 LBS

## 2025-03-24 VITALS — DIASTOLIC BLOOD PRESSURE: 80 MMHG | SYSTOLIC BLOOD PRESSURE: 142 MMHG

## 2025-03-24 DIAGNOSIS — K11.8 OTHER DISEASES OF SALIVARY GLANDS: ICD-10-CM

## 2025-03-24 DIAGNOSIS — E78.00 PURE HYPERCHOLESTEROLEMIA, UNSPECIFIED: ICD-10-CM

## 2025-03-24 DIAGNOSIS — I25.10 ATHEROSCLEROTIC HEART DISEASE OF NATIVE CORONARY ARTERY W/OUT ANGINA PECTORIS: ICD-10-CM

## 2025-03-24 DIAGNOSIS — I49.3 VENTRICULAR PREMATURE DEPOLARIZATION: ICD-10-CM

## 2025-03-24 PROCEDURE — 99214 OFFICE O/P EST MOD 30 MIN: CPT

## 2025-03-24 PROCEDURE — 36415 COLL VENOUS BLD VENIPUNCTURE: CPT

## 2025-03-24 PROCEDURE — 93000 ELECTROCARDIOGRAM COMPLETE: CPT

## 2025-03-25 DIAGNOSIS — R93.89 ABNORMAL FINDINGS ON DIAGNOSTIC IMAGING OF OTHER SPECIFIED BODY STRUCTURES: ICD-10-CM

## 2025-03-25 LAB
ALBUMIN SERPL ELPH-MCNC: 4.2 G/DL
ALP BLD-CCNC: 75 U/L
ALT SERPL-CCNC: 28 U/L
ANION GAP SERPL CALC-SCNC: 12 MMOL/L
AST SERPL-CCNC: 34 U/L
BILIRUB SERPL-MCNC: 0.5 MG/DL
BUN SERPL-MCNC: 19 MG/DL
CALCIUM SERPL-MCNC: 9.1 MG/DL
CHLORIDE SERPL-SCNC: 108 MMOL/L
CHOLEST SERPL-MCNC: 138 MG/DL
CO2 SERPL-SCNC: 22 MMOL/L
CREAT SERPL-MCNC: 0.6 MG/DL
EGFRCR SERPLBLD CKD-EPI 2021: 89 ML/MIN/1.73M2
ERYTHROCYTE [SEDIMENTATION RATE] IN BLOOD BY WESTERGREN METHOD: 3 MM/HR
FOLATE SERPL-MCNC: >20 NG/ML
GLUCOSE SERPL-MCNC: 96 MG/DL
HCT VFR BLD CALC: 39.7 %
HDLC SERPL-MCNC: 81 MG/DL
HGB BLD-MCNC: 12.6 G/DL
LDLC SERPL-MCNC: 46 MG/DL
MCHC RBC-ENTMCNC: 28.7 PG
MCHC RBC-ENTMCNC: 31.7 G/DL
MCV RBC AUTO: 90.4 FL
NONHDLC SERPL-MCNC: 57 MG/DL
PLATELET # BLD AUTO: 174 K/UL
POTASSIUM SERPL-SCNC: 4 MMOL/L
PROT SERPL-MCNC: 6 G/DL
RBC # BLD: 4.39 M/UL
RBC # FLD: 14.6 %
SODIUM SERPL-SCNC: 142 MMOL/L
TRIGL SERPL-MCNC: 50 MG/DL
TSH SERPL-ACNC: 0.75 UIU/ML
VIT B12 SERPL-MCNC: 1317 PG/ML
WBC # FLD AUTO: 5.71 K/UL

## 2025-03-26 ENCOUNTER — NON-APPOINTMENT (OUTPATIENT)
Age: 85
End: 2025-03-26

## 2025-03-26 ENCOUNTER — APPOINTMENT (OUTPATIENT)
Dept: OPHTHALMOLOGY | Facility: CLINIC | Age: 85
End: 2025-03-26
Payer: MEDICARE

## 2025-03-26 PROCEDURE — 92012 INTRM OPH EXAM EST PATIENT: CPT

## 2025-03-29 ENCOUNTER — NON-APPOINTMENT (OUTPATIENT)
Age: 85
End: 2025-03-29

## 2025-04-01 ENCOUNTER — APPOINTMENT (OUTPATIENT)
Dept: NEUROLOGY | Facility: CLINIC | Age: 85
End: 2025-04-01
Payer: MEDICARE

## 2025-04-01 VITALS
BODY MASS INDEX: 16.55 KG/M2 | HEIGHT: 66 IN | DIASTOLIC BLOOD PRESSURE: 85 MMHG | WEIGHT: 103 LBS | SYSTOLIC BLOOD PRESSURE: 147 MMHG | HEART RATE: 65 BPM

## 2025-04-01 DIAGNOSIS — F06.70 UNSPECIFIED DISORDER OF CIRCULATORY SYSTEM: ICD-10-CM

## 2025-04-01 DIAGNOSIS — E85.4 ORGAN-LIMITED AMYLOIDOSIS: ICD-10-CM

## 2025-04-01 DIAGNOSIS — R63.0 ANOREXIA: ICD-10-CM

## 2025-04-01 DIAGNOSIS — I99.9 UNSPECIFIED DISORDER OF CIRCULATORY SYSTEM: ICD-10-CM

## 2025-04-01 DIAGNOSIS — I68.0 ORGAN-LIMITED AMYLOIDOSIS: ICD-10-CM

## 2025-04-01 PROCEDURE — 99214 OFFICE O/P EST MOD 30 MIN: CPT

## 2025-04-01 RX ORDER — MIRTAZAPINE 7.5 MG/1
7.5 TABLET, FILM COATED ORAL
Qty: 30 | Refills: 3 | Status: ACTIVE | COMMUNITY
Start: 2025-04-01 | End: 1900-01-01

## 2025-04-01 RX ORDER — ACETAZOLAMIDE 250 MG/1
250 TABLET ORAL
Qty: 180 | Refills: 1 | Status: ACTIVE | COMMUNITY
Start: 2025-04-01

## 2025-04-11 ENCOUNTER — APPOINTMENT (OUTPATIENT)
Dept: ULTRASOUND IMAGING | Facility: CLINIC | Age: 85
End: 2025-04-11
Payer: MEDICARE

## 2025-04-11 ENCOUNTER — OUTPATIENT (OUTPATIENT)
Dept: OUTPATIENT SERVICES | Facility: HOSPITAL | Age: 85
LOS: 1 days | End: 2025-04-11
Payer: MEDICARE

## 2025-04-11 DIAGNOSIS — Z98.89 OTHER SPECIFIED POSTPROCEDURAL STATES: Chronic | ICD-10-CM

## 2025-04-11 DIAGNOSIS — R93.89 ABNORMAL FINDINGS ON DIAGNOSTIC IMAGING OF OTHER SPECIFIED BODY STRUCTURES: ICD-10-CM

## 2025-04-11 DIAGNOSIS — Z98.41 CATARACT EXTRACTION STATUS, RIGHT EYE: Chronic | ICD-10-CM

## 2025-04-11 DIAGNOSIS — Z41.9 ENCOUNTER FOR PROCEDURE FOR PURPOSES OTHER THAN REMEDYING HEALTH STATE, UNSPECIFIED: Chronic | ICD-10-CM

## 2025-04-11 DIAGNOSIS — K66.0 PERITONEAL ADHESIONS (POSTPROCEDURAL) (POSTINFECTION): Chronic | ICD-10-CM

## 2025-04-11 DIAGNOSIS — Z90.710 ACQUIRED ABSENCE OF BOTH CERVIX AND UTERUS: Chronic | ICD-10-CM

## 2025-04-11 PROCEDURE — 76536 US EXAM OF HEAD AND NECK: CPT | Mod: 26

## 2025-04-11 PROCEDURE — 76536 US EXAM OF HEAD AND NECK: CPT

## 2025-04-17 DIAGNOSIS — R45.1 RESTLESSNESS AND AGITATION: ICD-10-CM

## 2025-04-17 RX ORDER — OLANZAPINE 2.5 MG/1
2.5 TABLET, FILM COATED ORAL
Qty: 60 | Refills: 2 | Status: ACTIVE | COMMUNITY
Start: 2025-04-17

## 2025-04-22 ENCOUNTER — APPOINTMENT (OUTPATIENT)
Dept: OTOLARYNGOLOGY | Facility: CLINIC | Age: 85
End: 2025-04-22
Payer: MEDICARE

## 2025-04-22 VITALS — HEIGHT: 66 IN | BODY MASS INDEX: 16.55 KG/M2 | WEIGHT: 103 LBS

## 2025-04-22 DIAGNOSIS — K21.9 GASTRO-ESOPHAGEAL REFLUX DISEASE W/OUT ESOPHAGITIS: ICD-10-CM

## 2025-04-22 DIAGNOSIS — R49.0 DYSPHONIA: ICD-10-CM

## 2025-04-22 PROCEDURE — 31575 DIAGNOSTIC LARYNGOSCOPY: CPT

## 2025-04-22 PROCEDURE — 99213 OFFICE O/P EST LOW 20 MIN: CPT | Mod: 25

## 2025-04-22 RX ORDER — OMEPRAZOLE 20 MG/1
20 CAPSULE, DELAYED RELEASE ORAL
Qty: 30 | Refills: 0 | Status: ACTIVE | COMMUNITY
Start: 2025-04-22 | End: 1900-01-01

## 2025-04-27 ENCOUNTER — NON-APPOINTMENT (OUTPATIENT)
Age: 85
End: 2025-04-27

## 2025-05-02 DIAGNOSIS — F09 UNSPECIFIED MENTAL DISORDER DUE TO KNOWN PHYSIOLOGICAL CONDITION: ICD-10-CM

## 2025-05-02 RX ORDER — DONEPEZIL HYDROCHLORIDE 5 MG/1
5 PATCH TRANSDERMAL WEEKLY
Qty: 4 | Refills: 1 | Status: ACTIVE | COMMUNITY
Start: 2025-05-02 | End: 1900-01-01

## 2025-05-06 ENCOUNTER — APPOINTMENT (OUTPATIENT)
Dept: OPHTHALMOLOGY | Facility: CLINIC | Age: 85
End: 2025-05-06
Payer: MEDICARE

## 2025-05-06 ENCOUNTER — NON-APPOINTMENT (OUTPATIENT)
Age: 85
End: 2025-05-06

## 2025-05-06 PROCEDURE — 92012 INTRM OPH EXAM EST PATIENT: CPT

## 2025-06-16 ENCOUNTER — LABORATORY RESULT (OUTPATIENT)
Age: 85
End: 2025-06-16

## 2025-06-16 ENCOUNTER — NON-APPOINTMENT (OUTPATIENT)
Age: 85
End: 2025-06-16

## 2025-06-16 ENCOUNTER — APPOINTMENT (OUTPATIENT)
Dept: INTERNAL MEDICINE | Facility: CLINIC | Age: 85
End: 2025-06-16
Payer: MEDICARE

## 2025-06-16 ENCOUNTER — APPOINTMENT (OUTPATIENT)
Dept: CARDIOLOGY | Facility: CLINIC | Age: 85
End: 2025-06-16

## 2025-06-16 VITALS — DIASTOLIC BLOOD PRESSURE: 76 MMHG | SYSTOLIC BLOOD PRESSURE: 118 MMHG

## 2025-06-16 VITALS
OXYGEN SATURATION: 100 % | DIASTOLIC BLOOD PRESSURE: 73 MMHG | WEIGHT: 90.5 LBS | BODY MASS INDEX: 14.61 KG/M2 | HEART RATE: 73 BPM | SYSTOLIC BLOOD PRESSURE: 115 MMHG

## 2025-06-16 VITALS
OXYGEN SATURATION: 98 % | HEIGHT: 66 IN | DIASTOLIC BLOOD PRESSURE: 80 MMHG | WEIGHT: 98.6 LBS | HEART RATE: 74 BPM | BODY MASS INDEX: 15.84 KG/M2 | RESPIRATION RATE: 15 BRPM | SYSTOLIC BLOOD PRESSURE: 129 MMHG

## 2025-06-16 PROCEDURE — 99214 OFFICE O/P EST MOD 30 MIN: CPT

## 2025-06-16 PROCEDURE — 81003 URINALYSIS AUTO W/O SCOPE: CPT | Mod: QW

## 2025-06-16 PROCEDURE — 93000 ELECTROCARDIOGRAM COMPLETE: CPT

## 2025-06-16 PROCEDURE — G2211 COMPLEX E/M VISIT ADD ON: CPT

## 2025-06-16 PROCEDURE — 36415 COLL VENOUS BLD VENIPUNCTURE: CPT

## 2025-06-17 ENCOUNTER — APPOINTMENT (OUTPATIENT)
Dept: OTOLARYNGOLOGY | Facility: CLINIC | Age: 85
End: 2025-06-17
Payer: MEDICARE

## 2025-06-17 VITALS
HEART RATE: 76 BPM | DIASTOLIC BLOOD PRESSURE: 69 MMHG | WEIGHT: 98 LBS | HEIGHT: 66 IN | BODY MASS INDEX: 15.75 KG/M2 | SYSTOLIC BLOOD PRESSURE: 114 MMHG

## 2025-06-17 PROCEDURE — 99213 OFFICE O/P EST LOW 20 MIN: CPT

## 2025-06-17 RX ORDER — DONEPEZIL HYDROCHLORIDE 5 MG/1
5 PATCH TRANSDERMAL
Qty: 4 | Refills: 0 | Status: ACTIVE | COMMUNITY
Start: 2025-05-05

## 2025-06-17 RX ORDER — OLANZAPINE 2.5 MG/1
2.5 TABLET, FILM COATED ORAL
Qty: 60 | Refills: 0 | Status: ACTIVE | COMMUNITY
Start: 2025-05-22

## 2025-06-19 LAB
ALBUMIN SERPL ELPH-MCNC: 4.2 G/DL
ALP BLD-CCNC: 85 U/L
ALT SERPL-CCNC: 45 U/L
ANION GAP SERPL CALC-SCNC: 12 MMOL/L
AST SERPL-CCNC: 38 U/L
BILIRUB SERPL-MCNC: 0.3 MG/DL
BUN SERPL-MCNC: 17 MG/DL
CALCIUM SERPL-MCNC: 9.6 MG/DL
CHLORIDE SERPL-SCNC: 109 MMOL/L
CO2 SERPL-SCNC: 23 MMOL/L
CREAT SERPL-MCNC: 0.62 MG/DL
EGFRCR SERPLBLD CKD-EPI 2021: 88 ML/MIN/1.73M2
GLUCOSE SERPL-MCNC: 89 MG/DL
HCT VFR BLD CALC: 41.7 %
HGB BLD-MCNC: 12.5 G/DL
MCHC RBC-ENTMCNC: 28 PG
MCHC RBC-ENTMCNC: 30 G/DL
MCV RBC AUTO: 93.3 FL
PLATELET # BLD AUTO: 160 K/UL
POTASSIUM SERPL-SCNC: 4.2 MMOL/L
PROT SERPL-MCNC: 6.1 G/DL
RBC # BLD: 4.47 M/UL
RBC # FLD: 14.7 %
SODIUM SERPL-SCNC: 144 MMOL/L
TSH SERPL-ACNC: 0.8 UIU/ML
WBC # FLD AUTO: 6.42 K/UL

## 2025-06-22 ENCOUNTER — NON-APPOINTMENT (OUTPATIENT)
Age: 85
End: 2025-06-22

## 2025-07-07 ENCOUNTER — NON-APPOINTMENT (OUTPATIENT)
Age: 85
End: 2025-07-07

## 2025-07-08 ENCOUNTER — RX RENEWAL (OUTPATIENT)
Age: 85
End: 2025-07-08

## 2025-07-14 ENCOUNTER — APPOINTMENT (OUTPATIENT)
Dept: OPHTHALMOLOGY | Facility: CLINIC | Age: 85
End: 2025-07-14

## 2025-07-24 ENCOUNTER — RX RENEWAL (OUTPATIENT)
Age: 85
End: 2025-07-24

## 2025-07-24 RX ORDER — DONEPEZIL HYDROCHLORIDE 10 MG/1
10 TABLET ORAL
Qty: 90 | Refills: 1 | Status: ACTIVE | COMMUNITY
Start: 2025-07-24 | End: 1900-01-01

## 2025-07-30 ENCOUNTER — NON-APPOINTMENT (OUTPATIENT)
Age: 85
End: 2025-07-30

## 2025-07-30 ENCOUNTER — APPOINTMENT (OUTPATIENT)
Dept: OPHTHALMOLOGY | Facility: CLINIC | Age: 85
End: 2025-07-30
Payer: MEDICARE

## 2025-07-30 PROCEDURE — 92012 INTRM OPH EXAM EST PATIENT: CPT

## 2025-07-30 PROCEDURE — 92083 EXTENDED VISUAL FIELD XM: CPT

## 2025-08-01 ENCOUNTER — LABORATORY RESULT (OUTPATIENT)
Age: 85
End: 2025-08-01

## 2025-08-01 ENCOUNTER — APPOINTMENT (OUTPATIENT)
Dept: INTERNAL MEDICINE | Facility: CLINIC | Age: 85
End: 2025-08-01
Payer: MEDICARE

## 2025-08-01 VITALS
BODY MASS INDEX: 15.59 KG/M2 | RESPIRATION RATE: 16 BRPM | HEIGHT: 66 IN | TEMPERATURE: 98.6 F | HEART RATE: 67 BPM | DIASTOLIC BLOOD PRESSURE: 78 MMHG | SYSTOLIC BLOOD PRESSURE: 120 MMHG | OXYGEN SATURATION: 95 % | WEIGHT: 97 LBS

## 2025-08-01 VITALS — SYSTOLIC BLOOD PRESSURE: 122 MMHG | DIASTOLIC BLOOD PRESSURE: 78 MMHG

## 2025-08-01 VITALS — HEART RATE: 86 BPM

## 2025-08-01 DIAGNOSIS — I49.3 VENTRICULAR PREMATURE DEPOLARIZATION: ICD-10-CM

## 2025-08-01 DIAGNOSIS — U07.1 COVID-19: ICD-10-CM
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
## 2025-08-01 DIAGNOSIS — Z86.018 PERSONAL HISTORY OF OTHER BENIGN NEOPLASM: ICD-10-CM

## 2025-08-01 DIAGNOSIS — F43.22 ADJUSTMENT DISORDER WITH ANXIETY: ICD-10-CM

## 2025-08-01 DIAGNOSIS — F09 UNSPECIFIED MENTAL DISORDER DUE TO KNOWN PHYSIOLOGICAL CONDITION: ICD-10-CM

## 2025-08-01 DIAGNOSIS — R51.9 HEADACHE, UNSPECIFIED: ICD-10-CM

## 2025-08-01 DIAGNOSIS — K11.8 OTHER DISEASES OF SALIVARY GLANDS: ICD-10-CM

## 2025-08-01 DIAGNOSIS — Z87.2 PERSONAL HISTORY OF DISEASES OF THE SKIN AND SUBCUTANEOUS TISSUE: ICD-10-CM

## 2025-08-01 DIAGNOSIS — I34.0 NONRHEUMATIC MITRAL (VALVE) INSUFFICIENCY: ICD-10-CM

## 2025-08-01 DIAGNOSIS — R93.89 ABNORMAL FINDINGS ON DIAGNOSTIC IMAGING OF OTHER SPECIFIED BODY STRUCTURES: ICD-10-CM

## 2025-08-01 DIAGNOSIS — Z63.6 DEPENDENT RELATIVE NEEDING CARE AT HOME: ICD-10-CM

## 2025-08-01 DIAGNOSIS — I25.10 ATHEROSCLEROTIC HEART DISEASE OF NATIVE CORONARY ARTERY W/OUT ANGINA PECTORIS: ICD-10-CM

## 2025-08-01 DIAGNOSIS — R41.0 DISORIENTATION, UNSPECIFIED: ICD-10-CM

## 2025-08-01 DIAGNOSIS — R35.0 FREQUENCY OF MICTURITION: ICD-10-CM

## 2025-08-01 DIAGNOSIS — I70.0 ATHEROSCLEROSIS OF AORTA: ICD-10-CM

## 2025-08-01 DIAGNOSIS — R63.0 ANOREXIA: ICD-10-CM

## 2025-08-01 DIAGNOSIS — R26.81 UNSTEADINESS ON FEET: ICD-10-CM

## 2025-08-01 DIAGNOSIS — R63.4 ABNORMAL WEIGHT LOSS: ICD-10-CM

## 2025-08-01 DIAGNOSIS — R73.03 PREDIABETES.: ICD-10-CM

## 2025-08-01 DIAGNOSIS — Z87.898 PERSONAL HISTORY OF OTHER SPECIFIED CONDITIONS: ICD-10-CM

## 2025-08-01 PROCEDURE — 81003 URINALYSIS AUTO W/O SCOPE: CPT | Mod: QW

## 2025-08-01 PROCEDURE — 93000 ELECTROCARDIOGRAM COMPLETE: CPT

## 2025-08-01 PROCEDURE — 36415 COLL VENOUS BLD VENIPUNCTURE: CPT

## 2025-08-01 PROCEDURE — 99214 OFFICE O/P EST MOD 30 MIN: CPT

## 2025-08-01 SDOH — SOCIAL STABILITY - SOCIAL INSECURITY: DEPENDENT RELATIVE NEEDING CARE AT HOME: Z63.6

## 2025-08-03 LAB
ALBUMIN SERPL ELPH-MCNC: 4.3 G/DL
ALP BLD-CCNC: 88 U/L
ALT SERPL-CCNC: 38 U/L
ANION GAP SERPL CALC-SCNC: 11 MMOL/L
APPEARANCE: CLEAR
AST SERPL-CCNC: 34 U/L
BASOPHILS # BLD AUTO: 0.04 K/UL
BASOPHILS NFR BLD AUTO: 0.7 %
BILIRUB SERPL-MCNC: 0.3 MG/DL
BILIRUBIN URINE: NEGATIVE
BLOOD URINE: NEGATIVE
BUN SERPL-MCNC: 15 MG/DL
CALCIUM SERPL-MCNC: 9.6 MG/DL
CHLORIDE SERPL-SCNC: 109 MMOL/L
CHOLEST SERPL-MCNC: 142 MG/DL
CO2 SERPL-SCNC: 24 MMOL/L
COLOR: YELLOW
CREAT SERPL-MCNC: 0.63 MG/DL
EGFRCR SERPLBLD CKD-EPI 2021: 88 ML/MIN/1.73M2
EOSINOPHIL # BLD AUTO: 0.2 K/UL
EOSINOPHIL NFR BLD AUTO: 3.4 %
ESTIMATED AVERAGE GLUCOSE: 120 MG/DL
GLUCOSE QUALITATIVE U: NEGATIVE MG/DL
GLUCOSE SERPL-MCNC: 130 MG/DL
HBA1C MFR BLD HPLC: 5.8 %
HCT VFR BLD CALC: 38.9 %
HDLC SERPL-MCNC: 72 MG/DL
HGB BLD-MCNC: 12.2 G/DL
IMM GRANULOCYTES NFR BLD AUTO: 0.7 %
KETONES URINE: NEGATIVE MG/DL
LDLC SERPL-MCNC: 55 MG/DL
LEUKOCYTE ESTERASE URINE: ABNORMAL
LYMPHOCYTES # BLD AUTO: 1.48 K/UL
LYMPHOCYTES NFR BLD AUTO: 25 %
MAN DIFF?: NORMAL
MCHC RBC-ENTMCNC: 28.3 PG
MCHC RBC-ENTMCNC: 31.4 G/DL
MCV RBC AUTO: 90.3 FL
MONOCYTES # BLD AUTO: 0.49 K/UL
MONOCYTES NFR BLD AUTO: 8.3 %
NEUTROPHILS # BLD AUTO: 3.67 K/UL
NEUTROPHILS NFR BLD AUTO: 61.9 %
NITRITE URINE: NEGATIVE
NONHDLC SERPL-MCNC: 69 MG/DL
PH URINE: 5.5
PLATELET # BLD AUTO: 166 K/UL
POTASSIUM SERPL-SCNC: 4.2 MMOL/L
PROT SERPL-MCNC: 6.1 G/DL
PROTEIN URINE: NEGATIVE MG/DL
RBC # BLD: 4.31 M/UL
RBC # FLD: 13.8 %
SODIUM SERPL-SCNC: 144 MMOL/L
SPECIFIC GRAVITY URINE: 1.02
TRIGL SERPL-MCNC: 74 MG/DL
TSH SERPL-ACNC: 0.62 UIU/ML
UROBILINOGEN URINE: 0.2 MG/DL
WBC # FLD AUTO: 5.92 K/UL

## 2025-09-02 RX ORDER — OMEPRAZOLE 20 MG/1
20 TABLET, DELAYED RELEASE ORAL DAILY
Qty: 30 | Refills: 3 | Status: ACTIVE | COMMUNITY
Start: 2025-09-02 | End: 1900-01-01

## 2025-09-18 RX ORDER — MEMANTINE HYDROCHLORIDE 7 MG/1
7 CAPSULE, EXTENDED RELEASE ORAL AT BEDTIME
Qty: 30 | Refills: 1 | Status: ACTIVE | COMMUNITY
Start: 2025-09-18 | End: 1900-01-01

## 2025-09-19 RX ORDER — DONEPEZIL HYDROCHLORIDE 10 MG/1
10 PATCH TRANSDERMAL WEEKLY
Qty: 4 | Refills: 6 | Status: ACTIVE | COMMUNITY
Start: 2025-09-19 | End: 1900-01-01